# Patient Record
Sex: MALE | Race: WHITE | NOT HISPANIC OR LATINO | Employment: OTHER | URBAN - METROPOLITAN AREA
[De-identification: names, ages, dates, MRNs, and addresses within clinical notes are randomized per-mention and may not be internally consistent; named-entity substitution may affect disease eponyms.]

---

## 2024-01-25 ENCOUNTER — APPOINTMENT (OUTPATIENT)
Dept: MRI IMAGING | Facility: CLINIC | Age: 74
End: 2024-01-25
Attending: FAMILY MEDICINE
Payer: COMMERCIAL

## 2024-01-25 ENCOUNTER — HOSPITAL ENCOUNTER (EMERGENCY)
Facility: CLINIC | Age: 74
Discharge: HOME | End: 2024-01-25
Attending: FAMILY MEDICINE | Admitting: FAMILY MEDICINE
Payer: COMMERCIAL

## 2024-01-25 VITALS
TEMPERATURE: 97.6 F | OXYGEN SATURATION: 96 % | DIASTOLIC BLOOD PRESSURE: 97 MMHG | BODY MASS INDEX: 30.81 KG/M2 | RESPIRATION RATE: 16 BRPM | SYSTOLIC BLOOD PRESSURE: 155 MMHG | HEART RATE: 62 BPM | WEIGHT: 208 LBS | HEIGHT: 69 IN

## 2024-01-25 DIAGNOSIS — R41.3 MEMORY LOSS: ICD-10-CM

## 2024-01-25 LAB
ALBUMIN SERPL BCG-MCNC: 4.4 G/DL (ref 3.5–5.2)
ALP SERPL-CCNC: 97 U/L (ref 40–150)
ALT SERPL W P-5'-P-CCNC: 15 U/L (ref 0–70)
ANION GAP SERPL CALCULATED.3IONS-SCNC: 11 MMOL/L (ref 7–15)
AST SERPL W P-5'-P-CCNC: 26 U/L (ref 0–45)
BASOPHILS # BLD AUTO: 0.1 10E3/UL (ref 0–0.2)
BASOPHILS NFR BLD AUTO: 1 %
BILIRUB SERPL-MCNC: 1.6 MG/DL
BUN SERPL-MCNC: 21.4 MG/DL (ref 8–23)
CALCIUM SERPL-MCNC: 9.4 MG/DL (ref 8.8–10.2)
CHLORIDE SERPL-SCNC: 103 MMOL/L (ref 98–107)
CREAT SERPL-MCNC: 1.01 MG/DL (ref 0.67–1.17)
DEPRECATED HCO3 PLAS-SCNC: 25 MMOL/L (ref 22–29)
EGFRCR SERPLBLD CKD-EPI 2021: 78 ML/MIN/1.73M2
EOSINOPHIL # BLD AUTO: 0.2 10E3/UL (ref 0–0.7)
EOSINOPHIL NFR BLD AUTO: 2 %
ERYTHROCYTE [DISTWIDTH] IN BLOOD BY AUTOMATED COUNT: 14.6 % (ref 10–15)
FOLATE SERPL-MCNC: 27.1 NG/ML (ref 4.6–34.8)
GLUCOSE BLDC GLUCOMTR-MCNC: 164 MG/DL (ref 70–99)
GLUCOSE SERPL-MCNC: 158 MG/DL (ref 70–99)
HCT VFR BLD AUTO: 49.1 % (ref 40–53)
HGB BLD-MCNC: 16.3 G/DL (ref 13.3–17.7)
HOLD SPECIMEN: NORMAL
IMM GRANULOCYTES # BLD: 0 10E3/UL
IMM GRANULOCYTES NFR BLD: 0 %
LYMPHOCYTES # BLD AUTO: 1.5 10E3/UL (ref 0.8–5.3)
LYMPHOCYTES NFR BLD AUTO: 17 %
MCH RBC QN AUTO: 28.9 PG (ref 26.5–33)
MCHC RBC AUTO-ENTMCNC: 33.2 G/DL (ref 31.5–36.5)
MCV RBC AUTO: 87 FL (ref 78–100)
MONOCYTES # BLD AUTO: 0.8 10E3/UL (ref 0–1.3)
MONOCYTES NFR BLD AUTO: 9 %
NEUTROPHILS # BLD AUTO: 6.4 10E3/UL (ref 1.6–8.3)
NEUTROPHILS NFR BLD AUTO: 71 %
NRBC # BLD AUTO: 0 10E3/UL
NRBC BLD AUTO-RTO: 0 /100
PLATELET # BLD AUTO: 374 10E3/UL (ref 150–450)
POTASSIUM SERPL-SCNC: 4.5 MMOL/L (ref 3.4–5.3)
PROT SERPL-MCNC: 7.5 G/DL (ref 6.4–8.3)
RBC # BLD AUTO: 5.64 10E6/UL (ref 4.4–5.9)
SODIUM SERPL-SCNC: 139 MMOL/L (ref 135–145)
TSH SERPL DL<=0.005 MIU/L-ACNC: 4.04 UIU/ML (ref 0.3–4.2)
WBC # BLD AUTO: 8.9 10E3/UL (ref 4–11)

## 2024-01-25 PROCEDURE — 70553 MRI BRAIN STEM W/O & W/DYE: CPT

## 2024-01-25 PROCEDURE — 84443 ASSAY THYROID STIM HORMONE: CPT | Performed by: FAMILY MEDICINE

## 2024-01-25 PROCEDURE — 99284 EMERGENCY DEPT VISIT MOD MDM: CPT | Performed by: FAMILY MEDICINE

## 2024-01-25 PROCEDURE — 82962 GLUCOSE BLOOD TEST: CPT

## 2024-01-25 PROCEDURE — 82040 ASSAY OF SERUM ALBUMIN: CPT | Performed by: FAMILY MEDICINE

## 2024-01-25 PROCEDURE — 85025 COMPLETE CBC W/AUTO DIFF WBC: CPT | Performed by: FAMILY MEDICINE

## 2024-01-25 PROCEDURE — A9585 GADOBUTROL INJECTION: HCPCS | Performed by: FAMILY MEDICINE

## 2024-01-25 PROCEDURE — 255N000002 HC RX 255 OP 636: Performed by: FAMILY MEDICINE

## 2024-01-25 PROCEDURE — 36415 COLL VENOUS BLD VENIPUNCTURE: CPT | Performed by: FAMILY MEDICINE

## 2024-01-25 PROCEDURE — 82607 VITAMIN B-12: CPT | Performed by: FAMILY MEDICINE

## 2024-01-25 PROCEDURE — 99285 EMERGENCY DEPT VISIT HI MDM: CPT | Mod: 25 | Performed by: FAMILY MEDICINE

## 2024-01-25 PROCEDURE — 82746 ASSAY OF FOLIC ACID SERUM: CPT | Performed by: FAMILY MEDICINE

## 2024-01-25 RX ORDER — ATORVASTATIN CALCIUM 40 MG/1
1 TABLET, FILM COATED ORAL DAILY
COMMUNITY
Start: 2024-01-19 | End: 2024-06-21

## 2024-01-25 RX ORDER — ATENOLOL 50 MG/1
1 TABLET ORAL DAILY
COMMUNITY
Start: 2024-01-19 | End: 2024-06-21

## 2024-01-25 RX ORDER — GADOBUTROL 604.72 MG/ML
9.5 INJECTION INTRAVENOUS ONCE
Status: COMPLETED | OUTPATIENT
Start: 2024-01-25 | End: 2024-01-25

## 2024-01-25 RX ORDER — FLUTICASONE PROPIONATE 50 MCG
1 SPRAY, SUSPENSION (ML) NASAL DAILY PRN
COMMUNITY
End: 2024-03-28

## 2024-01-25 RX ORDER — ASPIRIN 81 MG/1
81 TABLET ORAL DAILY
COMMUNITY
Start: 2024-01-15

## 2024-01-25 RX ORDER — LOSARTAN POTASSIUM 50 MG/1
50 TABLET ORAL DAILY
COMMUNITY
Start: 2024-01-19 | End: 2024-06-21

## 2024-01-25 RX ORDER — TETRAHYDROZOLINE HCL 0.05 %
1 DROPS OPHTHALMIC (EYE) 3 TIMES DAILY PRN
COMMUNITY

## 2024-01-25 RX ADMIN — GADOBUTROL 9.5 ML: 604.72 INJECTION INTRAVENOUS at 17:37

## 2024-01-25 ASSESSMENT — ACTIVITIES OF DAILY LIVING (ADL)
ADLS_ACUITY_SCORE: 35
ADLS_ACUITY_SCORE: 35

## 2024-01-25 NOTE — ED TRIAGE NOTES
"Family brings in for concerns of mentation changes recent TIA with \"big time med changes\"    Family reports slowed speech word difficulties  Started about 1 hr ago complains of   Slight headache \"     Triage Assessment (Adult)       Row Name 01/25/24 1614          Triage Assessment    Airway WDL WDL        Respiratory WDL    Respiratory WDL WDL        Skin Circulation/Temperature WDL    Skin Circulation/Temperature WDL WDL        Cardiac WDL    Cardiac WDL WDL        Peripheral/Neurovascular WDL    Peripheral Neurovascular WDL WDL        Cognitive/Neuro/Behavioral WDL    Cognitive/Neuro/Behavioral WDL WDL                     "

## 2024-01-25 NOTE — MEDICATION SCRIBE - ADMISSION MEDICATION HISTORY
Medication Scribe Admission Medication History    Admission medication history is complete. The information provided in this note is only as accurate as the sources available at the time of the update.    Information Source(s): Patient via in-person    Pertinent Information:     Changes made to PTA medication list:  Added: asa 81, atenolol 50, lipitor 40, zheng nasal spray, losartan 25, eye drops  Deleted: None  Changed: None    Medication Affordability:       Allergies reviewed with patient and updates made in EHR: yes    Medication History Completed By: Felisha De La Cruz 1/25/2024 5:38 PM    PTA Med List   Medication Sig Note Last Dose    aspirin 81 MG EC tablet Take 81 mg by mouth daily  1/24/2024 at 1900    atenolol (TENORMIN) 50 MG tablet Take 1 tablet by mouth daily  1/24/2024 at 1900    atorvastatin (LIPITOR) 40 MG tablet Take 1 tablet by mouth daily  1/24/2024 at 1900    fluticasone (FLONASE) 50 MCG/ACT nasal spray Spray 1 spray into both nostrils daily as needed for rhinitis or allergies 1/25/2024: OTC, [place] brand 1/25/2024 at am    losartan (COZAAR) 25 MG tablet Take 1 tablet by mouth daily  1/24/2024 at 1900    tetrahydrozoline (VISINE) 0.05 % ophthalmic solution Place 1 drop into both eyes 3 times daily as needed  1/25/2024 at am

## 2024-01-25 NOTE — ED NOTES
"Pt reports he did not recognize his own vehicle in his driveway this morning. Has had intermittent \"missing periods of time\" today. Family reports pt was giving one word answers instead of his usual elaboration. Also reports right sided headache for three weeks. Pt had similar episodes in [place] where he was seen at [facility].   "

## 2024-01-25 NOTE — ED PROVIDER NOTES
History     Chief Complaint   Patient presents with    Aphasia     HPI    Prabhakar Randall is a 73 year old male who comes in from home with his son with an episode of memory impairment.  He had a nearly identical episode 2 weeks ago while driving in [place] and was seen in the emergency department there where he had a workup that included CT angiogram of the head and neck and an unenhanced CT scan of the brain that showed chronic small vessel ischemic disease and no other findings.  His vasculature was widely patent.  He was given a diagnosis of TIA and treated with low-dose aspirin and advised to follow-up in primary care for adjustment of his risk factors.  Since then he has had his blood pressure medication dosage doubled and the addition of a second medication that he does not know the name of as well as the addition of cholesterol-lowering medication.  The symptoms consist of not remembering what he is doing.  Today he went out to the car in the morning and could not remember where he had bought his new car from.  He went back into the house and a few hours later he could not remember what he was supposed to be doing tomorrow.  He is slow to respond to questions from his family and is giving more limited one-word type responses rather than expanding on his answers to questions that would be more typical for him.  This is what happened when he was in [place] and those symptoms lasted about 30 minutes at that time.  He had a previous episode a year before that for which she had a workup that was negative but was referred to neurology and never followed up.  He has not seen neurology since this occurred.  At his evaluation previously he did not have MRI imaging of the brain.  He does not smoke and rarely uses any alcohol.  He has had intermittent diplopia off-and-on for 6 to 8 months occurring about once per month he had an episode of this a few days ago.  He seen an eye doctor for it in the past.  He has noticed  "insomnia in the past 3 months.  Currently he denies weakness or numbness in 1 arm or 1 leg or any difficulty with finding words to say.  He has not noticed nor have others noticed slurred speech.  He has not had vision loss.  He has had a little bit of weight gain but no weight loss.  He is not short of breath.  He does not have chest pain.  He does have abdominal pain.  He has not had any change in his bowel or bladder function.  He has not been having fevers or signs of acute illness.    Allergies:  Allergies   Allergen Reactions    Oxycodone Other (See Comments)     Other reaction(s): hypotension       Problem List:    There are no problems to display for this patient.       Past Medical History:    No past medical history on file.    Past Surgical History:    No past surgical history on file.    Family History:    No family history on file.    Social History:  Marital Status:   [2]        Medications:    aspirin 81 MG EC tablet  atenolol (TENORMIN) 50 MG tablet  atorvastatin (LIPITOR) 40 MG tablet  fluticasone (FLONASE) 50 MCG/ACT nasal spray  losartan (COZAAR) 25 MG tablet  tetrahydrozoline (VISINE) 0.05 % ophthalmic solution          Review of Systems    All other systems are reviewed and are negative    Physical Exam   BP: (!) 140/91  Pulse: 64  Temp: 97.6  F (36.4  C)  Resp: 16  Height: 175.3 cm (5' 9\")  Weight: 94.3 kg (208 lb)  SpO2: 96 %      Physical Exam    Nursing note and vitals were reviewed.  Constitutional: Awake and alert, adequately nourished and developed appearing 73-year-old in no apparent discomfort, who does not appear acutely ill, and who answers questions appropriately and cooperates with examination.  HEENT: Voice quality is normal.  Atraumatic and normocephalic.  PERRL.  EOMI.   Neck: Freely mobile.  Cardiovascular: Cardiac examination reveals normal heart rate and regular rhythm without murmur.  Pulmonary/Chest: Breathing is unlabored.  Breath sounds are clear and equal " bilaterally.  There no retractions, tachypnea, rales, wheezes, or rhonchi.  Abdomen: Soft, nontender, no HSM or masses rebound or guarding.  Musculoskeletal: Extremities are warm and well-perfused and without edema  Neurological: Alert, oriented, thought content logical, coherent.  Cranial nerve testing is normal.  Motor strength is intact in the upper and lower extremities on manual muscle testing.  Cerebellar testing is normal.  Motor tone is normal.  Speech is fluent.  Skin: Warm, dry, no rashes.  Psychiatric: Affect broad and appropriate.  National Institutes of Health Stroke Scale  Exam Interval: Baseline   Score    Level of consciousness: (0)   Alert, keenly responsive    LOC questions: (0)   Answers both questions correctly    LOC commands: (0)   Performs both tasks correctly    Best gaze: (0)   Normal    Visual: (0)   No visual loss    Facial palsy: (0)   Normal symmetrical movements    Motor arm (left): (0)   No drift    Motor arm (right): (0)   No drift    Motor leg (left): (0)   No drift    Motor leg (right): (0)   No drift    Limb ataxia: (0)   Absent    Sensory: (0)   Normal- no sensory loss    Best language: (0)   Normal- no aphasia    Dysarthria: (0)   Normal    Extinction and inattention: (0)   No abnormality        Total Score:  0        ED Course                 Procedures              Critical Care time:  none               Results for orders placed or performed during the hospital encounter of 01/25/24 (from the past 24 hour(s))   [place] [facility]    Narrative    The following orders were created for panel order [place] [facility].  Procedure                               Abnormality         Status                     ---------                               -----------         ------                     Extra Blue Top Tube[[id number]]                              Final result               Extra Red Top Tube[[id number]]                               Final result               Extra Green Top  (Lithium...[[id number]]                      Final result               Extra Purple Top Tube[[id number]]                            Final result                 Please view results for these tests on the individual orders.   Extra Blue Top Tube   Result Value Ref Range    Hold Specimen JIC    Extra Red Top Tube   Result Value Ref Range    Hold Specimen JIC    Extra Green Top (Lithium Heparin) Tube   Result Value Ref Range    Hold Specimen JIC    Extra Purple Top Tube   Result Value Ref Range    Hold Specimen JIC    Glucose by meter   Result Value Ref Range    GLUCOSE BY METER POCT 164 (H) 70 - 99 mg/dL   CBC with platelets differential    Narrative    The following orders were created for panel order CBC with platelets differential.  Procedure                               Abnormality         Status                     ---------                               -----------         ------                     CBC with platelets and d...[[id number]]                      Final result                 Please view results for these tests on the individual orders.   Comprehensive metabolic panel   Result Value Ref Range    Sodium 139 135 - 145 mmol/L    Potassium 4.5 3.4 - 5.3 mmol/L    Carbon Dioxide (CO2) 25 22 - 29 mmol/L    Anion Gap 11 7 - 15 mmol/L    Urea Nitrogen 21.4 8.0 - 23.0 mg/dL    Creatinine 1.01 0.67 - 1.17 mg/dL    GFR Estimate 78 >60 mL/min/1.73m2    Calcium 9.4 8.8 - 10.2 mg/dL    Chloride 103 98 - 107 mmol/L    Glucose 158 (H) 70 - 99 mg/dL    Alkaline Phosphatase 97 40 - 150 U/L    AST 26 0 - 45 U/L    ALT 15 0 - 70 U/L    Protein Total 7.5 6.4 - 8.3 g/dL    Albumin 4.4 3.5 - 5.2 g/dL    Bilirubin Total 1.6 (H) <=1.2 mg/dL   TSH with free T4 reflex   Result Value Ref Range    TSH 4.04 0.30 - 4.20 uIU/mL   CBC with platelets and differential   Result Value Ref Range    WBC Count 8.9 4.0 - 11.0 10e3/uL    RBC Count 5.64 4.40 - 5.90 10e6/uL    Hemoglobin 16.3 13.3 - 17.7 g/dL    Hematocrit 49.1 40.0 - 53.0 %     MCV 87 78 - 100 fL    MCH 28.9 26.5 - 33.0 pg    MCHC 33.2 31.5 - 36.5 g/dL    RDW 14.6 10.0 - 15.0 %    Platelet Count 374 150 - 450 10e3/uL    % Neutrophils 71 %    % Lymphocytes 17 %    % Monocytes 9 %    % Eosinophils 2 %    % Basophils 1 %    % Immature Granulocytes 0 %    NRBCs per 100 WBC 0 <1 /100    Absolute Neutrophils 6.4 1.6 - 8.3 10e3/uL    Absolute Lymphocytes 1.5 0.8 - 5.3 10e3/uL    Absolute Monocytes 0.8 0.0 - 1.3 10e3/uL    Absolute Eosinophils 0.2 0.0 - 0.7 10e3/uL    Absolute Basophils 0.1 0.0 - 0.2 10e3/uL    Absolute Immature Granulocytes 0.0 <=0.4 10e3/uL    Absolute NRBCs 0.0 10e3/uL   MR Brain w/o & w Contrast    Narrative    EXAM: MR BRAIN W/O and W CONTRAST  LOCATION: [facility]  DATE: 1/25/2024    INDICATION: memory loss  COMPARISON: None.  CONTRAST: 9.5 mL Gadavist  TECHNIQUE: Routine multiplanar multisequence head MRI without and with intravenous contrast.    FINDINGS:  INTRACRANIAL CONTENTS: No acute or subacute infarct. No mass, acute hemorrhage, or extra-axial fluid collections. Patchy nonspecific T2/FLAIR hyperintensities within the cerebral white matter and radha most consistent with mild to moderate chronic   microvascular ischemic change. Minimal generalized cerebral atrophy, within normal limits for age. No hydrocephalus. A few punctate chronic microhemorrhages are noted. Normal position of the cerebellar tonsils. No pathologic contrast enhancement.    SELLA: No abnormality accounting for technique.    OSSEOUS STRUCTURES/SOFT TISSUES: Normal marrow signal. The major intracranial vascular flow voids are maintained.     ORBITS: Prior bilateral cataract surgery. Visualized portions of the orbits are otherwise unremarkable.     SINUSES/MASTOIDS: Mild mucosal thickening scattered about the paranasal sinuses. No middle ear or mastoid effusion.       Impression    IMPRESSION:  1.  No acute intracranial abnormality.  2.  Minimal age-related generalized  parenchymal volume loss. No particular lobar predominance.       Medications   gadobutrol (GADAVIST) injection 9.5 mL (9.5 mLs Intravenous $Given 1/25/24 0332)       MOCA score: 25 he lost points for not being able to draw a cube and for only being able to recall 2 words    Assessments & Plan (with Medical Decision Making)     73-year-old male presented with 2 episodes of memory loss as described above.  Had a workup 2 weeks ago [place] with CT angiogram of the head and neck and CT scan of the brain that were unrevealing.  Today underwent MRI imaging with the results as noted above again unrevealing with just some age-related volume loss.  The cause of his symptoms is uncertain.  I discussed the findings with he and his sons.  There is no evidence that he has had a stroke.  I discussed his case with Dr. Maciel and neurology.  Will add B12 testing to the labs already done.  I have ordered an outpatient EEG and referred him for neurology consultation.  Have also referred him to primary care to follow-up on the additional blood tests and help coordinate follow-up care.  He and his sons expressed understanding and their questions were answered.    I have reviewed the nursing notes.    I have reviewed the findings, diagnosis, plan and need for follow up with the patient.         New Prescriptions    No medications on file       Final diagnoses:   Memory loss       1/25/2024   [facility] EMERGENCY DEPT       Khai Borges MD  01/25/24 2014

## 2024-01-26 LAB — VIT B12 SERPL-MCNC: 860 PG/ML (ref 232–1245)

## 2024-01-26 NOTE — PROGRESS NOTES
I was called regarding this patient and his 2 episodes of difficulty recalling what he was doing, where he was going, etc.  The differential includes an underlying cognitive impairment not yet diagnosed, seizure disorder, as well as TIA (though I find the diagnosis of TIA to be less likely given the lack of any lateralizing symptoms/any vascular distribution of symptoms).  I have recommended outpatient EEG to assess for underlying seizure disorder, as well as serum B12 and formalized cognitive assessment such as a MoCA or SLUMS assessment.  Outpatient follow-up with general neurology.    Marquise Maciel MD

## 2024-01-31 NOTE — PROGRESS NOTES
"In person evaluation    HPI  2024, in person consultation      73-year-old being evaluated neurologically for:  Memory loss/transient memory difficulty    A.  Memory loss/transient memory difficulty repetitive       Spell 2024 (30 minutes), forgot that he had a blood draw the next day       Spell 1/10/2024 (30 minutes amnesia), was able to drive and do tasks but was a little bit unsure of where they were at and where they were going on a trip [place]       Winter 2023 while staying in [place] (2 hours \"amnesia\"), this episode may have lasted as long as 12 hours kind of vague difficulty with concentration          Patient was in [place] and evaluated for transient memory difficulty.          Spell consists of not remembering what he is doing.        Gives limited one-word answers when family asks him questions        Symptoms last about 30 minutes at a time            Has been more sleep deprived recently        Significant hearing loss    MoCA  2024,    29 out of 30    B.  TIA 2024    C.  Vascular risk factors        Hypertension/hyperlipidemia/TIA/factor V Leiden      Past medical history  TIA 2024  Hypertension  Hyperlipidemia  Factor V Leiden  BPH  Arthritis of the knee  Cluster versus migraine headaches times years more right-sided  Hearing loss        Habits  Past smoker quit   Alcohol once per month rare  Played hockey and baseball but does not recall any significant head trauma      Family history  Father heart disease  Mother may have had memory loss in her 90s  at age 100  Sister aortic aneurysm            Workup  Laboratory data 2016  Factor II gene mutation not detected  Cardiolipin antibody negative, Antithrombin III antibody negative Lupus anticoagulant negative  Beta-2 glycoprotein negative, protein C and protein S not deficient    CT scan head 1/10/2024  1. There is no evidence of acute intracranial abnormality.   2.  Mild brain volume loss.   3.  There are " bilateral changes of chronic small vessel  ischemic disease in the periventricular deep white matter.   CTA head and neck 1/10/2024  1. Normal CTA. No focal stenosis, dissection or emergent large vessel  occlusion identified.   2. Incidental note is made of medial deviation of the internal carotid arteries more pronounced on the left than the right.       This is  a normal variant.   MRI brain 1/25/2024  1.  No acute intracranial abnormality.  2.  Minimal age-related generalized parenchymal volume loss.       No particular lobar predominance.  Echo 1/25/2024, ejection fraction 55-60%, left atrium normal size  EEG 2/1/2024, 9.5-10 Hz PDR  Normal awake and drowsy EEG,        Laboratory data review                            1/2024  NA/K              139/4.5  BUN/Cr          21.4/1.01  GLU               158  AST                26  WBC/HGB      8.9/16.3  PLTs               374,000    B12                 868  TSH                4.04  Folate             27.1    MoCA  2/2/2024,    29 out of 30    Exam    Review of systems  Pertinent positives negatives  Has possibly migraines or cluster headaches for years on the right side  Hearing loss and ringing in the ears  Excessive sleepiness all the time  The memory trouble as above  Hearing loss otherwise per HPI above      General exam  Blood pressure 160/101, pulse 55  Alert orient x 3  Lungs clear  Heart rate regular  Abdomen soft  Symmetrical pulses  No edema the feet    Neurologic exam  Alert orient x 3  Prosody speech normal  Naming normal  Comprehension normal  Repetition normal  No aphasia  No neglect    MoCA  2/2/2024,    29 out of 30    Cranial nerves II through XII  No ophthalmoplegia  No nystagmus  Visual fields intact  Face symmetrical  Tongue twisters good  Does have hearing loss even with his hearing aid in  Mild decreased blink    Upper extremities  No drift  No tremor  Normal finger-nose    Lower extremities  Distal proximal strength good    Reflexes  "symmetrical    Gait  Normal    Assessment/plan    1.  Memory loss/transient memory difficulty repetitive       Spell 1/25/2024 (30 minutes)       Spell 1/10/2024 (30 minutes amnesia)       Winter 2023 while staying in [place] (2 hours \"amnesia\")          Patient was in [place] and evaluated for transient memory difficulty.          Spell consists of not remembering what he is doing.        Gives limited one-word answers when family asks him questions        Symptoms last about 30 minutes at a time            Has been more sleep deprived recently    MoCA  2/2/2024,    29 out of 30    2.  Vascular risk factors        Hypertension/hyperlipidemia/TIA/factor V Leiden      3.  Excessive fatigue and tiredness question sleep apnea    Diagnosis  Episodic memory loss  Questionable TGA  Question sleep apnea  Multifocal subcortical white matter changes on MRI    Rule out sleep apnea with sleep study  Hold off on empiric seizure medication    May have some neurodegenerative component but subtle and may be exacerbated by underlying sleep apnea    Follow-up in 3 months after the above testing    Discussed with patient wife and son  Reviewed actual MRI scans with them    Total care time today 75 minutes    As part of visit today  Reviewed CT scan/MRI scans as above  Reviewed laboratory data  Reviewed recent hospitalization EMR notes  Reviewed cardiology note 1/19/2024    "

## 2024-02-01 ENCOUNTER — HOSPITAL ENCOUNTER (OUTPATIENT)
Dept: NEUROLOGY | Facility: CLINIC | Age: 74
Discharge: HOME | End: 2024-02-01
Attending: FAMILY MEDICINE | Admitting: FAMILY MEDICINE
Payer: COMMERCIAL

## 2024-02-01 DIAGNOSIS — R41.3 MEMORY LOSS: ICD-10-CM

## 2024-02-01 PROCEDURE — 95819 EEG AWAKE AND ASLEEP: CPT

## 2024-02-01 PROCEDURE — 95819 EEG AWAKE AND ASLEEP: CPT | Mod: 26 | Performed by: PSYCHIATRY & NEUROLOGY

## 2024-02-02 ENCOUNTER — OFFICE VISIT (OUTPATIENT)
Dept: NEUROLOGY | Facility: CLINIC | Age: 74
End: 2024-02-02
Attending: FAMILY MEDICINE
Payer: COMMERCIAL

## 2024-02-02 VITALS
HEIGHT: 69 IN | BODY MASS INDEX: 30.96 KG/M2 | WEIGHT: 209 LBS | SYSTOLIC BLOOD PRESSURE: 160 MMHG | HEART RATE: 55 BPM | DIASTOLIC BLOOD PRESSURE: 101 MMHG

## 2024-02-02 DIAGNOSIS — R41.3 EPISODIC MEMORY LOSS: ICD-10-CM

## 2024-02-02 DIAGNOSIS — G45.4 TRANSIENT GLOBAL AMNESIA: Primary | ICD-10-CM

## 2024-02-02 PROCEDURE — 99205 OFFICE O/P NEW HI 60 MIN: CPT | Performed by: PSYCHIATRY & NEUROLOGY

## 2024-02-02 PROCEDURE — 99417 PROLNG OP E/M EACH 15 MIN: CPT | Performed by: PSYCHIATRY & NEUROLOGY

## 2024-02-02 ASSESSMENT — MONTREAL COGNITIVE ASSESSMENT (MOCA)
7. [VIGILENCE] TAP WHEN HEARING DESIGNATED LETTER: 1
9. REPEAT EACH SENTENCE: 2
11. FOR EACH PAIR OF WORDS, WHAT CATEGORY DO THEY BELONG TO (OUT OF 2): 2
10. [FLUENCY] NAME WORDS STARTING WITH DESIGNATED LETTER: 1
WHAT LEVEL OF EDUCATION WAS ATTAINED: 0
VISUOSPATIAL/EXECUTIVE SUBSCORE: 5
8. SERIAL SUBTRACTION OF 7S: 3
6. READ LIST OF DIGITS [FORWARD/BACKWARD]: 2
13. ORIENTATION SUBSCORE: 6
4. NAME EACH OF THE THREE ANIMALS SHOWN: 3
12. MEMORY INDEX SCORE: 4
WHAT IS THE TOTAL SCORE (OUT OF 30): 29

## 2024-02-02 NOTE — NURSING NOTE
DIPESH COGNITIVE ASSESSMENT (MOCA)  Version 7.1 Original Version  VISUOSPATIAL/EXECUTIVE               COPY CUBE      [   1 ]                                [  1  ] DRAW CLOCK (Ten past eleven)  (3 points)    [ 1   ]                    [  1  ]               [ 1   ]       Contour            Numbers     Hands POINTS                  5 / 5   NAMING    [  1 ]                                                                        [  1  ]                                             [  1  ]  Lisergio Gacria                                Camel                    3 / 3   MEMORY Read list of words, subject must repeat them. Do 2 trials, even if 1st trial is successful. Do a recall after 5 minutes  FACE VELVET Gnosticism BRANDON RED No Points    1st          2nd         ATTENTION Read list of digits (1 digit/sec) Subject has to repeat in the forward order       [  1  ]   2  1  8  5  4                                [   1 ] 7 4 2                         2 /2   Read list of letters. The subject must tap with his hand at each letter A. No points if > 2 errors.  [  1  ] F B A C M N A A J K L B A F A K D E A A A J A M O F A A B             1 /1   Serial 7 subtraction starting at 100          [   1 ] 93         [  1  ] 86          [ 1   ] 79          [ 1   ] 72         [  1  ] 65   4 or 5 correct subtractions: 3 points,  2 or 3 correct: 2 points,  1correct: 1 point,   0 correct: 0 points           3 /3   LANGUAGE Repeat: I only know that Remberto is the one to help today. [   1  ]                                      The cat always hid under the couch when dogs were in the room. [  1 ]               2/2   Fluency: Name maximum number of words in one minute that begin with the letter F                                                                                                                    [  1  ] __18_ (N > 11 words)              1 /1   ABSTRACTION Similarity  between e.g. banana-orange=fruit                                                                   [  1  ] train-bicycle                      [ 1   ] watch-ruler             2 /2   DELAYED  RECALL Has to recall words  WITH NO CUE FACE  [  1  ] VELVET  [   0 ] Jehovah's witness  [ 1   ]  BRANDON  [  1  ] RED  [ 1   ] Points for UNCUED recall only           4 /5           OPTIONAL Category cue           Multiple choice cue          ORIENTATION  [  1  ] Date     [  1  ] Month       [   1 ] Year      [  1  ] Day      [  1  ] Place        [  1  ] City         6 /6   TOTAL  Normal > 26/30 Add 1 point if < 12 years education       29 /30

## 2024-02-02 NOTE — LETTER
"    2024         RE: Prabhakar Randall  [address] [facility] [address]        Dear Colleague,    Thank you for referring your patient, Prabhakar Randall, to the [facility] [place]. Please see a copy of my visit note below.    In person evaluation    HPI  2024, in person consultation      73-year-old being evaluated neurologically for:  Memory loss/transient memory difficulty    A.  Memory loss/transient memory difficulty repetitive       Spell 2024 (30 minutes), forgot that he had a blood draw the next day       Spell 1/10/2024 (30 minutes amnesia), was able to drive and do tasks but was a little bit unsure of where they were at and where they were going on a trip driving south       Winter 2023 while staying in [place] (2 hours \"amnesia\"), this episode may have lasted as long as 12 hours kind of vague difficulty with concentration          Patient was in [place] and evaluated for transient memory difficulty.          Spell consists of not remembering what he is doing.        Gives limited one-word answers when family asks him questions        Symptoms last about 30 minutes at a time            Has been more sleep deprived recently        Significant hearing loss    MoCA  2024,    29 out of 30    B.  TIA 2024    C.  Vascular risk factors        Hypertension/hyperlipidemia/TIA/factor V Leiden      Past medical history  TIA 2024  Hypertension  Hyperlipidemia  Factor V Leiden  BPH  Arthritis of the knee  Cluster versus migraine headaches times years more right-sided  Hearing loss        Habits  Past smoker quit   Alcohol once per month rare  Played hockey and baseball but does not recall any significant head trauma      Family history  Father heart disease  Mother may have had memory loss in her 90s  at age 100  Sister aortic aneurysm            Workup  Laboratory data 2016  Factor II gene mutation not detected  Cardiolipin antibody negative, Antithrombin III " antibody negative Lupus anticoagulant negative  Beta-2 glycoprotein negative, protein C and protein S not deficient    CT scan head 1/10/2024  1. There is no evidence of acute intracranial abnormality.   2.  Mild brain volume loss.   3.  There are bilateral changes of chronic small vessel  ischemic disease in the periventricular deep white matter.   CTA head and neck 1/10/2024  1. Normal CTA. No focal stenosis, dissection or emergent large vessel  occlusion identified.   2. Incidental note is made of medial deviation of the internal carotid arteries more pronounced on the left than the right.       This is  a normal variant.   MRI brain 1/25/2024  1.  No acute intracranial abnormality.  2.  Minimal age-related generalized parenchymal volume loss.       No particular lobar predominance.  Echo 1/25/2024, ejection fraction 55-60%, left atrium normal size  EEG 2/1/2024, 9.5-10 Hz PDR  Normal awake and drowsy EEG,        Laboratory data review                            1/2024  NA/K              139/4.5  BUN/Cr          21.4/1.01  GLU               158  AST                26  WBC/HGB      8.9/16.3  PLTs               374,000    B12                 868  TSH                4.04  Folate             27.1    MoCA  2/2/2024,    29 out of 30    Exam    Review of systems  Pertinent positives negatives  Has possibly migraines or cluster headaches for years on the right side  Hearing loss and ringing in the ears  Excessive sleepiness all the time  The memory trouble as above  Hearing loss otherwise per HPI above      General exam  Blood pressure 160/101, pulse 55  Alert orient x 3  Lungs clear  Heart rate regular  Abdomen soft  Symmetrical pulses  No edema the feet    Neurologic exam  Alert orient x 3  Prosody speech normal  Naming normal  Comprehension normal  Repetition normal  No aphasia  No neglect    MoCA  2/2/2024,    29 out of 30    Cranial nerves II through XII  No ophthalmoplegia  No nystagmus  Visual fields intact  Face  "symmetrical  Tongue twisters good  Does have hearing loss even with his hearing aid in  Mild decreased blink    Upper extremities  No drift  No tremor  Normal finger-nose    Lower extremities  Distal proximal strength good    Reflexes symmetrical    Gait  Normal    Assessment/plan    1.  Memory loss/transient memory difficulty repetitive       Spell 1/25/2024 (30 minutes)       Spell 1/10/2024 (30 minutes amnesia)       Winter 2023 while staying in [place] (2 hours \"amnesia\")          Patient was in [place] and evaluated for transient memory difficulty.          Spell consists of not remembering what he is doing.        Gives limited one-word answers when family asks him questions        Symptoms last about 30 minutes at a time            Has been more sleep deprived recently    MoCA  2/2/2024,    29 out of 30    2.  Vascular risk factors        Hypertension/hyperlipidemia/TIA/factor V Leiden      3.  Excessive fatigue and tiredness question sleep apnea    Diagnosis  Episodic memory loss  Questionable TGA  Question sleep apnea  Multifocal subcortical white matter changes on MRI    Rule out sleep apnea with sleep study  Hold off on empiric seizure medication    May have some neurodegenerative component but subtle and may be exacerbated by underlying sleep apnea    Follow-up in 3 months after the above testing    Discussed with patient wife and son  Reviewed actual MRI scans with them    Total care time today 75 minutes    As part of visit today  Reviewed CT scan/MRI scans as above  Reviewed laboratory data  Reviewed recent hospitalization EMR notes  Reviewed cardiology note 1/19/2024      Again, thank you for allowing me to participate in the care of your patient.        Sincerely,        megha Jimenez MD  "

## 2024-02-02 NOTE — NURSING NOTE
Chief Complaint   Patient presents with    Memory Loss     Pt is having episodes of memory lapses. Once was driving to [place] and forgot where he was going, and who he was going to see, about a minute later could remember everything. Had one episode where he woke in the morning and couldn't remember the prior day. Lasts for a short time. He also experiences a headache that is focused on right side after these episodes.       Karla De La Cruz LPN on 2/2/2024 at 12:16 PM

## 2024-02-06 ENCOUNTER — OFFICE VISIT (OUTPATIENT)
Dept: FAMILY MEDICINE | Facility: CLINIC | Age: 74
End: 2024-02-06
Payer: COMMERCIAL

## 2024-02-06 ENCOUNTER — TELEPHONE (OUTPATIENT)
Dept: FAMILY MEDICINE | Facility: CLINIC | Age: 74
End: 2024-02-06

## 2024-02-06 VITALS
TEMPERATURE: 98.1 F | BODY MASS INDEX: 30.47 KG/M2 | RESPIRATION RATE: 20 BRPM | DIASTOLIC BLOOD PRESSURE: 90 MMHG | HEART RATE: 60 BPM | HEIGHT: 69 IN | SYSTOLIC BLOOD PRESSURE: 148 MMHG | OXYGEN SATURATION: 96 % | WEIGHT: 205.7 LBS

## 2024-02-06 DIAGNOSIS — R73.09 ELEVATED GLUCOSE: ICD-10-CM

## 2024-02-06 DIAGNOSIS — G45.4 TGA (TRANSIENT GLOBAL AMNESIA): Primary | ICD-10-CM

## 2024-02-06 DIAGNOSIS — I10 HYPERTENSION, UNSPECIFIED TYPE: ICD-10-CM

## 2024-02-06 LAB — HBA1C MFR BLD: 5.4 % (ref 0–5.6)

## 2024-02-06 PROCEDURE — 36415 COLL VENOUS BLD VENIPUNCTURE: CPT | Performed by: STUDENT IN AN ORGANIZED HEALTH CARE EDUCATION/TRAINING PROGRAM

## 2024-02-06 PROCEDURE — 83036 HEMOGLOBIN GLYCOSYLATED A1C: CPT | Performed by: STUDENT IN AN ORGANIZED HEALTH CARE EDUCATION/TRAINING PROGRAM

## 2024-02-06 PROCEDURE — 99203 OFFICE O/P NEW LOW 30 MIN: CPT | Performed by: STUDENT IN AN ORGANIZED HEALTH CARE EDUCATION/TRAINING PROGRAM

## 2024-02-06 RX ORDER — RESPIRATORY SYNCYTIAL VIRUS VACCINE 120MCG/0.5
0.5 KIT INTRAMUSCULAR ONCE
Qty: 1 EACH | Refills: 0 | Status: CANCELLED | OUTPATIENT
Start: 2024-02-06 | End: 2024-02-06

## 2024-02-06 ASSESSMENT — PAIN SCALES - GENERAL: PAINLEVEL: NO PAIN (0)

## 2024-02-06 NOTE — PROGRESS NOTES
"  1. TGA (transient global amnesia), appears to have improved, if not resolved   > has follow-up with neurologist who recommended patient get evaluated with sleep study, per patient his next available appointment wasn't until a few months out   -Since event, patient has been started on Lipitor 40 mg and aspirin 81 mg  - Adult Sleep Eval & Management  Referral; Future    2. Elevated glucose  - Hemoglobin A1c    3. Hypertension  - has follow-up with cardiology, recently had his antihypertensive regimen increased from 25 mg losartan to 50 mg     The risks, benefits and treatment options of prescribed medications or other treatments have been discussed with the patient. The patient verbalized their understanding and should call or follow up if no improvement or if they develop further problems.       Shayy Radford is a 73 year old, presenting for the following health issues:  ER F/U        2/6/2024     9:26 AM   Additional Questions   Roomed by Meagan SUÁREZ LPN   Accompanied by Wife         2/6/2024     9:26 AM   Patient Reported Additional Medications   Patient reports taking the following new medications Multi vitamin daily     HPI     ED/UC Followup:    Facility:  [facility]  Date of visit: 1/25/2024  Reason for visit: aphasia; memory loss  Current Status: patient states he's been doing good all along. Hasn't had any episodes since the 25th of January     States he has a hard time getting restful sleep   Daytime sleepiness  Worst episode occurred when he was on vacation in [place]       ROS:   As above         Objective    BP (!) 148/90 (BP Location: Right arm, Patient Position: Sitting, Cuff Size: Adult Regular)   Pulse 60   Temp 98.1  F (36.7  C) (Tympanic)   Resp 20   Ht 1.754 m (5' 9.06\")   Wt 93.3 kg (205 lb 11.2 oz)   SpO2 96%   BMI 30.33 kg/m    Body mass index is 30.33 kg/m .  Physical Exam  Constitutional:       General: He is not in acute distress.  HENT:      Head: " Normocephalic and atraumatic.      Right Ear: Tympanic membrane, ear canal and external ear normal.      Left Ear: Tympanic membrane, ear canal and external ear normal.      Ears:      Comments: Had impacted cerumen in the right ear but was able to manually remove with lighted curette      Mouth/Throat:      Mouth: Mucous membranes are moist.      Pharynx: Oropharynx is clear. No oropharyngeal exudate or posterior oropharyngeal erythema.   Eyes:      Extraocular Movements: Extraocular movements intact.   Cardiovascular:      Rate and Rhythm: Normal rate and regular rhythm.      Heart sounds: Normal heart sounds.   Pulmonary:      Effort: Pulmonary effort is normal. No respiratory distress.      Breath sounds: Normal breath sounds. No wheezing or rhonchi.   Abdominal:      Palpations: Abdomen is soft. There is no mass.      Tenderness: There is no abdominal tenderness.   Musculoskeletal:         General: No deformity. Normal range of motion.      Cervical back: Normal range of motion and neck supple.   Skin:     General: Skin is warm.      Findings: No rash.   Neurological:      General: No focal deficit present.      Mental Status: He is alert and oriented to person, place, and time.      Coordination: Coordination normal.      Gait: Gait normal.   Psychiatric:         Mood and Affect: Mood normal.                  Signed Electronically by: TOPHER BRYANT MD

## 2024-02-06 NOTE — TELEPHONE ENCOUNTER
Reason for Call:  Appointment Request    Patient requesting this type of appt:  Sleep consult referral in Epic. Urgent reqst in 3-5 days. Pt currently dilshad'd for 3.28.24. Call back Pt if able to get in sooner due to referral.     Requested provider:  in     Reason patient unable to be scheduled: Not within requested timeframe    When does patient want to be seen/preferred time:  3-5 days    Comments:   Could we send this information to you in [facility] or would you prefer to receive a phone call?:       Call taken on 2/6/2024 at 11:26 AM by Nikki Dunbar

## 2024-02-07 NOTE — TELEPHONE ENCOUNTER
Chart reviewed. Patient has soonest available appointment scheduled. Patient was added to wait list.  Clinic staff will reach out if sooner appointment becomes available.     Corazon ARANDA RN  [facility]

## 2024-03-10 ENCOUNTER — HEALTH MAINTENANCE LETTER (OUTPATIENT)
Age: 74
End: 2024-03-10

## 2024-03-26 ASSESSMENT — SLEEP AND FATIGUE QUESTIONNAIRES
HOW LIKELY ARE YOU TO NOD OFF OR FALL ASLEEP WHILE SITTING AND READING: SLIGHT CHANCE OF DOZING
HOW LIKELY ARE YOU TO NOD OFF OR FALL ASLEEP WHILE SITTING QUIETLY AFTER LUNCH WITHOUT ALCOHOL: SLIGHT CHANCE OF DOZING
HOW LIKELY ARE YOU TO NOD OFF OR FALL ASLEEP IN A CAR, WHILE STOPPED FOR A FEW MINUTES IN TRAFFIC: WOULD NEVER DOZE
HOW LIKELY ARE YOU TO NOD OFF OR FALL ASLEEP WHILE SITTING INACTIVE IN A PUBLIC PLACE: WOULD NEVER DOZE
HOW LIKELY ARE YOU TO NOD OFF OR FALL ASLEEP WHILE WATCHING TV: SLIGHT CHANCE OF DOZING
HOW LIKELY ARE YOU TO NOD OFF OR FALL ASLEEP WHEN YOU ARE A PASSENGER IN A CAR FOR AN HOUR WITHOUT A BREAK: WOULD NEVER DOZE
HOW LIKELY ARE YOU TO NOD OFF OR FALL ASLEEP WHILE SITTING AND TALKING TO SOMEONE: WOULD NEVER DOZE
HOW LIKELY ARE YOU TO NOD OFF OR FALL ASLEEP WHILE LYING DOWN TO REST IN THE AFTERNOON WHEN CIRCUMSTANCES PERMIT: SLIGHT CHANCE OF DOZING

## 2024-03-26 NOTE — PROGRESS NOTES
Does Prabhakar have a CPAP/Bipap?  No     Leeds Sleep Scale:  4  Stop Ban  BMI: 30.27  Neck: 40 cm         Outpatient Sleep Medicine Consultation:      Name: Prabhakar Randall MRN# [id number]   Age: 73 year old YOB: 1950     Date of Consultation: 2024  Consultation is requested by: Harrison Jimenez MD  [address]  [place] [address] Harrison Jimenez  Primary care provider: Elena Lopez       Reason for Sleep Consult:     Prabhakar Randall is sent by Harrison Jimenez for a sleep consultation regarding possible sleep apnea. Patient has had episodes of memory loss and is referred per neurology.. He does snore and has witnessed apneas, .    Patient s Reason for visit  Prabhakar Randall main reason for visit: Neurology dr set this up  Patient states problem(s) started: 3 months ago  Prabhakar Randall's goals for this visit: None           Assessment and Plan:     Summary Sleep Diagnoses:  Suspected sleep apnea-snoring, witnessed apneas, stop Bang 4.     Comorbid Diagnoses:  HTN  BPM  Memory loss  HLD  Factor V leiden      Summary Recommendations:  Lab study for suspected sleep apnea.   No orders of the defined types were placed in this encounter.        Summary Counseling:    Sleep Testing Reviewed  Obstructive Sleep Apnea Reviewed  Complications of Untreated Sleep Apnea Reviewed      Medical Decision-making:   Educational materials provided in instructions    Total time spent reviewing medical records, history and physical examination, review of previous testing and interpretation as well as documentation on this date:45 min    CC: Harrison Jimenez          History of Present Illness:     Past Sleep Evaluations:    SLEEP-WAKE SCHEDULE:     Work/School Days: Patient goes to school/work: No   Usually gets into bed at 11 pm  Takes patient about Minute to fall asleep  Has trouble falling asleep 1 nights per week  Wakes up in the middle of the night 1 times.  Wakes up  due to Use the bathroom  He has trouble falling back asleep None times a week.   It usually takes 2 minutes to get back to sleep  Patient is usually up at 7 am  Uses alarm: No    Weekends/Non-work Days/All Other Days:  Usually gets into bed at 11 pm   Takes patient about 1 minute to fall asleep  Patient is usually up at 7 am  Uses alarm: No    Sleep Need  Patient gets  7 to 8 hours sleep on average   Patient thinks he needs about 7 hrs sleep    Prabhakar Randall prefers to sleep in this position(s): Back;Side;Head Elevated   Patient states they do the following activities in bed:      Naps  Patient takes a purposeful nap 1 per day times a week and naps are usually 30 minutes in duration  He feels better after a nap: Yes  He dozes off unintentionally 5 days per week  Patient has had a driving accident or near-miss due to sleepiness/drowsiness: No      SLEEP DISRUPTIONS:    Breathing/Snoring  Patient snores:Yes  Other people complain about his snoring: Yes  Patient has been told he stops breathing in his sleep:Yes  He has issues with the following: Morning mouth dryness;Stuffy nose when you wake up    Movement:  Patient gets pain, discomfort, with an urge to move:  No  It happens when he is resting:  No  It happens more at night:  No  Patient has been told he kicks his legs at night:  No     Behaviors in Sleep:  Prabhakar Randall has experienced the following behaviors while sleeping:    He has experienced sudden muscle weakness during the day: No      Is there anything else you would like your sleep provider to know:          CAFFEINE AND OTHER SUBSTANCES:    Patient consumes caffeinated beverages per day:  1  Last caffeine use is usually: 8 am  List of any prescribed or over the counter stimulants that patient takes: None  List of any prescribed or over the counter sleep medication patient takes: None  List of previous sleep medications that patient has tried: None  Patient drinks alcohol to help them sleep: No  Patient  drinks alcohol near bedtime: No    Family History:  Patient has a family member been diagnosed with a sleep disorder: No            SCALES:    EPWORTH SLEEPINESS SCALE         3/26/2024    11:51 AM    Union Sleepiness Scale ( VANDA Lora  [id number]<br>ESS - [place]/English - Final version - 21 Nov 07 - [facility] [place].)   Sitting and reading Slight chance of dozing   Watching TV Slight chance of dozing   Sitting, inactive in a public place (e.g. a theatre or a meeting) Would never doze   As a passenger in a car for an hour without a break Would never doze   Lying down to rest in the afternoon when circumstances permit Slight chance of dozing   Sitting and talking to someone Would never doze   Sitting quietly after a lunch without alcohol Slight chance of dozing   In a car, while stopped for a few minutes in traffic Would never doze   Union Score (MC) 4   Union Score (Sleep) 4         INSOMNIA SEVERITY INDEX (KEN)          3/26/2024    11:35 AM   Insomnia Severity Index (KEN)   Difficulty falling asleep 0   Difficulty staying asleep 1   Problems waking up too early 0   How SATISFIED/DISSATISFIED are you with your CURRENT sleep pattern? 1   How NOTICEABLE to others do you think your sleep problem is in terms of impairing the quality of your life? 3   How WORRIED/DISTRESSED are you about your current sleep problem? 0   To what extent do you consider your sleep problem to INTERFERE with your daily functioning (e.g. daytime fatigue, mood, ability to function at work/daily chores, concentration, memory, mood, etc.) CURRENTLY? 0   KEN Total Score 5       Guidelines for Scoring/Interpretation:  Total score categories:  0-7 = No clinically significant insomnia   8-14 = Subthreshold insomnia   15-21 = Clinical insomnia (moderate severity)  22-28 = Clinical insomnia (severe)  Used via courtesy of www.ClickFoxth.va.gov with permission from Lv Joiner PhD., [facility]      STOP BANG         3/28/2024    10:48  "AM   STOP BANG Questionnaire (  2008, the American Society of Anesthesiologists, Inc. [employer].)   Neck Cir (cm) Clinic: 40 cm   B/P Clinic: 128/90   BMI Clinic: 30.27         GAD7         No data to display                    CAGE-AID         No data to display                  CAGE-AID reprinted with permission from the [place] Medical Journal, TRENT Silva. and ARMOND Dumont, \"Conjoint screening questionnaires for alcohol and drug abuse\" [place] Medical Journal 94: 135-140, 1995.      PATIENT HEALTH QUESTIONNAIRE-9 (PHQ - 9)         No data to display                  Developed by Rosanna Basurto, Pepper Tineo, Shahram Coleman and colleagues, with an educational jack from [employer] No permission required to reproduce, translate, display or distribute.        Allergies:    Allergies   Allergen Reactions    Oxycodone Other (See Comments)     Other reaction(s): hypotension       Medications:    Current Outpatient Medications   Medication Sig Dispense Refill    aspirin 81 MG EC tablet Take 81 mg by mouth daily      atenolol (TENORMIN) 50 MG tablet Take 1 tablet by mouth daily      atorvastatin (LIPITOR) 40 MG tablet Take 1 tablet by mouth daily      losartan (COZAAR) 50 MG tablet Take 50 mg by mouth daily      tetrahydrozoline (VISINE) 0.05 % ophthalmic solution Place 1 drop into both eyes 3 times daily as needed         Problem List:  There are no problems to display for this patient.       Past Medical/Surgical History:  No past medical history on file.  No past surgical history on file.    Social History:  Social History     Socioeconomic History    Marital status:      Spouse name: Not on file    Number of children: Not on file    Years of education: Not on file    Highest education level: Not on file   Occupational History    Not on file   Tobacco Use    Smoking status: Never    Smokeless tobacco: Never   Vaping Use    Vaping Use: Never used   Substance and Sexual " "Activity    Alcohol use: Yes     Comment: once a month    Drug use: Not on file    Sexual activity: Not on file   Other Topics Concern    Not on file   Social History Narrative    Not on file     Social Determinants of Health     Financial Resource Strain: Not on file   Food Insecurity: Not on file   Transportation Needs: Not on file   Physical Activity: Not on file   Stress: Not on file   Social Connections: Not on file   Interpersonal Safety: Not on file   Housing Stability: Not on file       Family History:  No family history on file.    Review of Systems:  A complete review of systems reviewed by me is negative with the exeption of what has been mentioned in the history of present illness.        Physical Examination:  Vitals: BP (!) 128/90   Pulse 56   Ht 1.753 m (5' 9\")   Wt 93 kg (205 lb)   SpO2 94%   BMI 30.27 kg/m    BMI= Body mass index is 30.27 kg/m .    Neck Cir (cm): 40 cm      GENERAL APPEARANCE: healthy, alert, and no distress  EYES: Eyes grossly normal to inspection, PERRL, and conjunctivae and sclerae normal  HENT: nose and mouth without ulcers or lesions and oropharynx crowded  NECK: no adenopathy, no asymmetry, masses, or scars, and trachea midline and normal to palpation  RESP: lungs clear to auscultation - no rales, rhonchi or wheezes  CV: regular rates and rhythm, normal S1 S2, no S3 or S4, and no murmur, click or rub  MS: extremities normal- no gross deformities noted  PSYCH: mentation appears normal and affect normal/bright  Mallampati Class: II.  Tonsillar Stage: 3  extending beyond pillars.         Data: All pertinent previous laboratory data reviewed     Recent Labs   Lab Test 01/25/24  1630      POTASSIUM 4.5   CHLORIDE 103   CO2 25   ANIONGAP 11   *  158*   BUN 21.4   CR 1.01   TYREL 9.4       Recent Labs   Lab Test 01/25/24  1630   WBC 8.9   RBC 5.64   HGB 16.3   HCT 49.1   MCV 87   MCH 28.9   MCHC 33.2   RDW 14.6          Recent Labs   Lab Test 01/25/24  1630 " "  PROTTOTAL 7.5   ALBUMIN 4.4   BILITOTAL 1.6*   ALKPHOS 97   AST 26   ALT 15       TSH (uIU/mL)   Date Value   01/25/2024 4.04       No results found for: \"UAMP\", \"UBARB\", \"BENZODIAZEUR\", \"UCANN\", \"UCOC\", \"OPIT\", \"UPCP\"    No results found for: \"IRONSAT\", \"EM57302\", \"YOLA\"    No results found for: \"PH\", \"PHARTERIAL\", \"PO2\", \"VT8NOYCNWFO\", \"SAT\", \"PCO2\", \"HCO3\", \"BASEEXCESS\", \"JANAE\", \"BEB\"    @LABRCNTIPR(phv:4,pco2v:4,po2v:4,hco3v:4,love:4,o2per:4)@    Echocardiology: No results found for this or any previous visit (from the past 4320 hour(s)).    Chest x-ray: No results found for this or any previous visit from the past 365 days.      Chest CT:   CT Chest w/o Contrast 06/22/2023    Narrative  EXAM: CT CHEST wo CONTRAST  LOCATION: [facility]  DATE: 6/22/2023    INDICATION: Thoracic aortic ectasia.  COMPARISON: None.  TECHNIQUE: CT chest without IV contrast. Multiplanar reformats were  obtained. Dose reduction techniques were used.  CONTRAST: None.    FINDINGS:  LUNGS AND PLEURA: Benign calcified granulomas both lungs.    MEDIASTINUM/AXILLAE: Mild aneurysmal dilatation of the ascending  thoracic aorta measuring 4.4 cm. Mild dilatation of the arch  measuring 3.5 cm. Descending thoracic aorta is mildly ectatic  measuring 3.4 cm p.m. mild calcified atheromatous plaque. Normal  heart size. No adenopathy.    CORONARY ARTERY CALCIFICATION: Mild.    UPPER ABDOMEN: Benign left hepatic cyst.    MUSCULOSKELETAL: Mild compression deformity of T5 of indeterminate  age.    Impression  IMPRESSION:  1.  Mild aneurysmal dilatation of the ascending thoracic aorta  measuring 4.4 cm.  2.  Mild dilatation of the arch and descending thoracic aorta  measuring 3.5 cm and 3.4 cm.  3.  Mild compression deformity of T5 of indeterminate age.  4.  Benign sequela of prior granulomatous infection.      PFT: Most Recent [facility] Pulmonary Function Testing    No results found for: \"20001\"  No results found for: \"20002\"  No results " "found for: \"20003\"  No results found for: \"20015\"  No results found for: \"20016\"  No results found for: \"20027\"  No results found for: \"20028\"  No results found for: \"20029\"  No results found for: \"20079\"  No results found for: \"20080\"  No results found for: \"20081\"  No results found for: \"20335\"  No results found for: \"20105\"  No results found for: \"20053\"  No results found for: \"20054\"  No results found for: \"20055\"      Kianna Castle [facility] 3/28/2024           "

## 2024-03-28 ENCOUNTER — OFFICE VISIT (OUTPATIENT)
Dept: SLEEP MEDICINE | Facility: CLINIC | Age: 74
End: 2024-03-28
Attending: PSYCHIATRY & NEUROLOGY
Payer: COMMERCIAL

## 2024-03-28 VITALS
SYSTOLIC BLOOD PRESSURE: 150 MMHG | HEART RATE: 56 BPM | HEIGHT: 69 IN | BODY MASS INDEX: 30.36 KG/M2 | WEIGHT: 205 LBS | DIASTOLIC BLOOD PRESSURE: 93 MMHG | OXYGEN SATURATION: 94 %

## 2024-03-28 DIAGNOSIS — R41.3 EPISODIC MEMORY LOSS: ICD-10-CM

## 2024-03-28 DIAGNOSIS — R29.818 SUSPECTED SLEEP APNEA: Primary | ICD-10-CM

## 2024-03-28 DIAGNOSIS — G45.4 TRANSIENT GLOBAL AMNESIA: ICD-10-CM

## 2024-03-28 PROCEDURE — 99203 OFFICE O/P NEW LOW 30 MIN: CPT | Performed by: PHYSICIAN ASSISTANT

## 2024-03-28 NOTE — NURSING NOTE
Weight management plan: Patient was referred to their PCP to discuss a diet and exercise plan.     Prabhakar to follow up with Primary Care provider regarding elevated blood pressure.

## 2024-03-28 NOTE — PATIENT INSTRUCTIONS
" [facility]   Clinic Office Hours:  Monday-- Thursday   8:00 am -- 4:30 pm CST  Friday  8:00 am-3:30 pm [facility]  Clinic Numbers- [phone]  Sleep Lab Address   [address] [place] [id number]  Sleep Lab Phone: [phone]    Your sleep study has been scheduled for: 7/18/24           Sleep Lab Telephone: [phone]    Thank you for choosing [facility]   Please take your time to read through this information.  This information has been designed to help you understand what it takes to begin your journey to a better night's rest. The contents of this packet include:  Study Preparation Instructions  An outline of your Sleep Study's Procedures.    Our technical staff will accommodate you as much as they are able. However, if you have special needs during the night in which you require a Personal Care Attendant, please make arrangements to bring that person along with you and notify us ahead of time by calling [phone].        Upon arrival please park in the west side parking lot near the Emergency Department Entrance   Enter through the \"Emergency Department\" doors and check in at the Emergency Department desk. Let them know you are here for a sleep study.  Please bring your insurance card and photo ID to the study with you.  You will receive a confirmation call 48 hours prior to your study time. If you do not receive a confirmation call, please call [phone].  If you need to cancel or reschedule for any reason, please contact us 24 hours before your scheduled appointment at [phone].  Please make arrangements to follow-up with your specialist to review your sleep study.  We hope to make your night with us as comfortable and as pleasant as possible. If you have any questions after reading through this packet, please feel free to call us.        TO PREPARE FOR YOUR SLEEP STUDY, PLEASE FOLLOW THE FOLLOWING GUIDELINES:    Please have the " next day available for continued testing in the event this is necessary.    Please bathe and wash your hair the day of the test. Hair should be clean, dry, and free from excessive oils, gels or sprays.  Upon arrival, men should be clean shaven (no stubble). If you have a mustache, goatee, or beard, it is not necessary to shave it off.  Bring comfortable 2-piece sleepwear.  If you prefer, you can bring your own pillow, blanket, orthopedic device, etc.  Arise at your regular time on the morning of your study and DO NOT NAP during the day.  Avoid stimulants (coffee, tea, chocolate, soda pop, energy drinks, any other caffeine drinks) after noon on the day of the study.  Eat a normal evening meal before you arrive for your study.   9.  If you prefer a specific snack before bedtime, please bring it along. We have a refrigerator and a microwave available.  10. Continue to take all prescribed medications unless otherwise instructed by your provider.  11. Bring medications for a 24-hour period, as well as any medications prescribed specifically for your sleep study.  Do not take until the sleep staff advises you to take the medications.   12. If you are incontinent please bring your Adult pads, depends or diapers.  13. Feel free to bring your own toiletries (Our lab will have them if you forget).  14. Please DO NOT wear gel nail polish or gel covering, it is ideal not to have any polish on at all, this can cause discrepancies to the equipment that is utilized to perform your sleep study.  15. If you use a C-PAP Machine, please refrain from using it for 3 days ( 72 hours) prior to the sleep study.       1. Arrival      Please arrive at [facility] at 8:00 P.M. to complete any necessary paperwork.  2. Interview      After settling into your room, your technologist will explain what will happen during your study. Please feel free to ask any questions about your study. At this time, your  technologist will collect your medication list and sleep diary.  3. Patient Set-up      The entire process is non-invasive and painless. We will be measuring your head and placing electrodes on your head and face. This will allow us to monitor your sleep. In addition, we will place monitors and sensors on your body that will monitor your breathing, limb movements, snoring and heart rate. Your set-up will take about 45 minutes to 1 hour to complete.  Lights Out  When it is time for bed, your technologist will help you get comfortable. Comfort is essential for a good night's sleep. If you are uncomfortable in any way, please tell your technologist. Before turning out the lights, your technologist will check your equipment. These checks will help you to have the best study possible. After the lights are turned off, you are free to sleep in whatever position is most comfortable to you. Please note: at some point during the night, you will be asked to sleep on your back. If you are unable to sleep on your back, please let your technologist know. Also, even though you will be connected to wires, you will be able to use the restroom when needed.  **Remember: this is a medical test so phones and television will be turned off for this test.  5. Lights On  The technologist will allow you to sleep in until 6:00 - 7:00 A.M. If you have a specific wake-up time requirement, please notify the technologist so that he or she may accommodate your needs. All the water-soluble conduction gel, electrodes and monitoring devices are easily removed. Upon completion, you will want to shower before you start your day. We do supply soap, shampoo and towels, though, if you prefer to bring your own supplies, please feel free to do so. Please be aware that your technologist will not be able to disclose any information regarding the findings of your study. This information will come from your provider at your follow-up appt.  Please schedule  follow up appointment two weeks after your sleep study, if a follow up has not already been scheduled for your results.       CANCELLATION / RESCHEDULING    Our care team at [facility], strives to provide exceptional - high quality care to all of our customers.     We have set aside our technical experts time and a room especially for you to perform your sleep study.     Working together to serve your comprehensive sleep health needs is very important to us. However, we do understand that changing an appointment is sometimes necessary.    If you find that you are unable to make your scheduled appointment, please contact us directly at least 24-hours prior to your scheduled sleep study at [phone]. If you are greeted by our answering system, please spell your name, give your date of birth and leave a detailed message. If you are calling the day of your sleep study and it is after 4:30, please call [phone] and leave a detailed message.     If you cancel with less than a 24-hour notice or do not show up for your sleep study without prior notification, we will send a letter to your referring provider and we will contact you to assist in rescheduling your study.            Thank you for choosing [facility] for your sleep health needs.    PLEASE BE AWARE [facility] ARE NOT RESPONSIBLE FOR ANY ITEMS LOST OR LEFT DURING YOUR STAY.      The problem of recurrent insomnia is discussed. Avoidance of caffeine sources is strongly encouraged. Sleep hygiene issues are reviewed. The use of sedative hypnotics for temporary relief is appropriate; we discussed the addictive nature of these drugs, and a one-time only prescription for prn use of a hypnotic is given, to use no more than 3 times per week for 2-3 weeks.

## 2024-04-26 ENCOUNTER — TELEPHONE (OUTPATIENT)
Dept: NEUROLOGY | Facility: CLINIC | Age: 74
End: 2024-04-26
Payer: COMMERCIAL

## 2024-04-26 NOTE — TELEPHONE ENCOUNTER
"[facility] Call Center    Phone Message    May a detailed message be left on voicemail: yes     Reason for Call: Other: Patient's wife Sarika called stating patient \"had another episode recently\", and that he was seen in two hospitals. She wonders if Dr. Jimenez can get the sleep study results before appointment on 5/3/2024, sleep study scheduled for 4/29/2024. Sarika is requesting a call back to discuss next steps stating she was concerned patient had multiple episodes. She can be reached at [phone].     Action Taken: Message routed to:  Other: [facility]    Travel Screening: Not Applicable                                                                   "

## 2024-04-29 ENCOUNTER — THERAPY VISIT (OUTPATIENT)
Dept: SLEEP MEDICINE | Facility: CLINIC | Age: 74
End: 2024-04-29
Attending: PHYSICIAN ASSISTANT
Payer: COMMERCIAL

## 2024-04-29 DIAGNOSIS — R29.818 SUSPECTED SLEEP APNEA: ICD-10-CM

## 2024-04-29 PROCEDURE — 95810 POLYSOM 6/> YRS 4/> PARAM: CPT | Performed by: INTERNAL MEDICINE

## 2024-04-29 ASSESSMENT — SLEEP AND FATIGUE QUESTIONNAIRES
HOW LIKELY ARE YOU TO NOD OFF OR FALL ASLEEP WHEN YOU ARE A PASSENGER IN A CAR FOR AN HOUR WITHOUT A BREAK: WOULD NEVER DOZE
HOW LIKELY ARE YOU TO NOD OFF OR FALL ASLEEP IN A CAR, WHILE STOPPED FOR A FEW MINUTES IN TRAFFIC: WOULD NEVER DOZE
HOW LIKELY ARE YOU TO NOD OFF OR FALL ASLEEP WHILE SITTING QUIETLY AFTER LUNCH WITHOUT ALCOHOL: SLIGHT CHANCE OF DOZING
HOW LIKELY ARE YOU TO NOD OFF OR FALL ASLEEP WHILE WATCHING TV: SLIGHT CHANCE OF DOZING
HOW LIKELY ARE YOU TO NOD OFF OR FALL ASLEEP WHILE LYING DOWN TO REST IN THE AFTERNOON WHEN CIRCUMSTANCES PERMIT: MODERATE CHANCE OF DOZING
HOW LIKELY ARE YOU TO NOD OFF OR FALL ASLEEP WHILE SITTING INACTIVE IN A PUBLIC PLACE: WOULD NEVER DOZE
HOW LIKELY ARE YOU TO NOD OFF OR FALL ASLEEP WHILE SITTING AND READING: SLIGHT CHANCE OF DOZING
HOW LIKELY ARE YOU TO NOD OFF OR FALL ASLEEP WHILE SITTING AND TALKING TO SOMEONE: WOULD NEVER DOZE

## 2024-04-29 NOTE — TELEPHONE ENCOUNTER
Called and spoke with patient's spouse, who is just making sure Dr. Jimenez is aware of some recent episodes (that have occurred outside the [facility] system).    Writer conveyed that we do have access to most encounters through care everywhere, but also advised that with 5/3 appointment upcoming, that would be the best time to assess/discuss and get feedback from Dr. Jimenez.    Mandi states understanding (no Ctc appearing on file, and no patient specific information given; this Ctc should be gathered at upcoming appointment).    Juan Daniel Tao RN, BSN  [facility]

## 2024-05-01 ENCOUNTER — MYC MEDICAL ADVICE (OUTPATIENT)
Dept: SLEEP MEDICINE | Facility: CLINIC | Age: 74
End: 2024-05-01
Payer: COMMERCIAL

## 2024-05-01 DIAGNOSIS — G47.33 OSA (OBSTRUCTIVE SLEEP APNEA): Primary | ICD-10-CM

## 2024-05-01 LAB — SLPCOMP: NORMAL

## 2024-05-01 NOTE — PROGRESS NOTES
"In person evaluation    HPI  2/2/2024, in person consultation  5/3/2024, in person visit      73-year-old being evaluated neurologically for:  Memory loss/transient memory difficulty    CT head 4/26/2024  There is a well-delineated hyperattenuated mass attached to the anterosuperior portion of the third ventricle.         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   2.  No hemorrhage  3.  No acute stroke  4.  No ventriculomegaly    Prolactin level 16 (4/26/2024)  Altered mental status change see ED [facility] ER 4/26/2024  Patient had been turkey hunting and had gotten up at 4:30 AM, spell happened at desk so he is quite sleep deprived  He just come down the heel and he was confused about events but he was awake alert talking and walking  They got in the car to drive to the [facility] and after being in the car 30 minutes seem to be coming around himself  The patient himself says that he \"woke up when he got to the [facility]\" which was an hour and 45 minutes later but family member who is with him says it was only 30 minutes of spell  All of these happen when he is sleep deprived  Finally after the workup at the hospital got back to the hotel at 3 AM went into a deep sleep and was difficult to arouse      Sleep study 4/29/2024 per note patient at high risk for obstructive sleep apnea,  Patient's sleep study showed \"severe sleep apnea\" sleep specialist recommended in lab titration study versus home with auto CPAP titration study.    I feel that getting the sleep disorder treated and seeing if the spells become less frequent would be the first choice.    And a conference with patient and family members about the above and I feel that we should first treat the sleep disorder before labeling him with any type of seizures      A.  Memory loss/transient memory difficulty repetitive        Winter 2023 while staying in [place] (2 hours \"amnesia\"), this " episode may have lasted as long as 12 hours kind of vague difficulty with concentratio       Spell 1/10/2024 (30 minutes amnesia), was able to drive and do tasks but was a little bit unsure of where they were at and where they were going on a trip driving south       Spell 2024 (30 minutes), forgot that he had a blood draw the next day       Spell 2024 (30 minutes transient), effort he took the hunting blinds down without with others.  No actual loss of consciousness            Patient was in [place] and evaluated for transient memory difficulty.          Spell consists of not remembering what he is doing.        Gives limited one-word answers when family asks him questions        Symptoms last about 30 minutes at a time            Has been more sleep deprived recently        Significant hearing loss    MoCA  2024,    29 out of 30    B.  TIA 2024    C.  Vascular risk factors        Hypertension/hyperlipidemia/TIA/factor V Leiden      Past medical history  TIA 2024  Hypertension  Hyperlipidemia  Factor V Leiden  BPH  Arthritis of the knee  Cluster versus migraine headaches times years more right-sided  Hearing loss        Habits  Past smoker quit 1984  Alcohol once per month rare  Played hockey and baseball but does not recall any significant head trauma      Family history  Father heart disease  Mother may have had memory loss in her 90s  at age 100  Sister aortic aneurysm            Workup  Laboratory data 2016  Factor II gene mutation not detected  Cardiolipin antibody negative, Antithrombin III antibody negative Lupus anticoagulant negative  Beta-2 glycoprotein negative, protein C and protein S not deficient    CT scan head 1/10/2024  1. There is no evidence of acute intracranial abnormality.   2.  Mild brain volume loss.   3.  There are bilateral changes of chronic small vessel  ischemic disease in the periventricular deep white matter.   CTA head and neck 1/10/2024  1. Normal  CTA. No focal stenosis, dissection or emergent large vessel  occlusion identified.   2. Incidental note is made of medial deviation of the internal carotid arteries more pronounced on the left than the right.       This is  a normal variant.   MRI brain 1/25/2024  1.  No acute intracranial abnormality.  2.  Minimal age-related generalized parenchymal volume loss.       No particular lobar predominance.  Echo 1/25/2024, ejection fraction 55-60%, left atrium normal size  EEG 2/1/2024, 9.5-10 Hz PDR,  Normal awake and drowsy EEG,    CT head 4/26/2024  There is a well-delineated hyperattenuated mass attached to the anterosuperior portion of the third ventricle.         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   2.  No hemorrhage  3.  No acute stroke  4.  No ventriculomegaly    Laboratory data review                            1/2024            4/626/24  NA/K              139/4.5            139/4.2  BUN/Cr          21.4/1.01          22/1.01  GLU               158                   121     AST                26  WBC/HGB      8.9/16.3          PLTs               374,000    B12                 868  TSH                4.04  Folate             27.1  Prolactin                                    60  4-15      MoCA  2/2/2024,    29 out of 30    Exam    Review of systems  Pertinent positives negatives  Has possibly migraines or cluster headaches for years on the right side  Hearing loss and ringing in the ears  Excessive sleepiness all the time  The memory trouble as above  Hearing loss     Otherwise unremarkable      General exam  Blood pressure 143/93, pulse 60  Alert orient x 3  Lungs clear  Heart rate regular  Abdomen soft  Symmetrical pulses  No edema the feet    Neurologic exam  Alert orient x 3  Prosody speech normal  Naming normal  Comprehension normal  Repetition normal  No aphasia  No neglect    MoCA  2/2/2024,    29 out of 30    Cranial nerves II  "through XII  No ophthalmoplegia  No nystagmus  Visual fields intact  Face symmetrical  Tongue twisters good  Does have hearing loss even with his hearing aid in  Mild decreased blink    Upper extremities  No drift  No tremor  Normal finger-nose    Lower extremities  Distal proximal strength good    Reflexes symmetrical    Gait  Normal    Assessment/plan    1.  Memory loss/transient memory difficulty repetitive       Spell 1/25/2024 (30 minutes)       Spell 1/10/2024 (30 minutes amnesia)       Winter 2023 while staying in [place] (2 hours \"amnesia\")          Patient was in [place] and evaluated for transient memory difficulty.          Spell consists of not remembering what he is doing.        Gives limited one-word answers when family asks him questions        Symptoms last about 30 minutes at a time            Has been more sleep deprived recently    MoCA  2/2/2024,    29 out of 30    2.  Vascular risk factors        Hypertension/hyperlipidemia/TIA/factor V Leiden      3.  Severe sleep apnea       Excessive fatigue and tiredness question sleep apnea    Diagnosis  Episodic memory loss  Questionable TGA  Severe sleep apnea  Multifocal subcortical white matter changes on MRI    Rule out sleep apnea with sleep study    Sleep study 4/29/2024 per note patient at high risk for obstructive sleep apnea,  Patient's sleep study showed \"severe sleep apnea\" sleep specialist recommended in lab titration study versus home with auto CPAP titration study.    I feel that getting the sleep disorder treated and seeing if the spells become less frequent would be the first choice.    And a conference with patient and family members about the above and I feel that we should first treat the sleep disorder before labeling him with any type of seizures    May have some neurodegenerative component but subtle and may be exacerbated by underlying sleep apnea    Follow-up in November 2024    We gave him phone numbers and information to try to " contact the sleep team to expedite getting a sleep mask which I feel is very important  I do not feel that he should be waiting till August to get a sleep mask organized.    Total care time today 37 minutes    As part of the visit today  Reviewed ER note 4/26/2024  Reviewed CT scan 4/26/2024  Reviewed sleep evaluation 4/29/2024 and 5/1/2024   [T: ___] : T[unfilled] [N: ___] : N[unfilled] [AJCC Stage: ____] : AJCC Stage: [unfilled] [de-identified] : 74 y/o female who presents for initial breast medical oncology consultation.\par \par The patient has a history of right breast IDC, ER/MD+, HER2-, [id number], diagnosed in 2015.  The patient underwent a right lumpectomy with SLNB at [facility] with negative margins, 5/5 negative LNs and low Oncotype score.  She was followed by radiation therapy completed 3/12/2015.  The patient resided in [facility] and took Arimidex for 5 years, completed 1/2020. Dr Linares \par \par She was followed with routine mammograms annually.  Mammogram in [facility] 3/2020- abnormal. Could not get care due to the pandemic. [place] and underwent imaging on 10/7/20. A right breast mass at the 8:00 axis, 7 cm from the nipple was discovered and biopsy was recommended.  The biopsy was done on 10/29/20 and pathology revealed an invasive ductal carcinoma, grade 3 ER/MD negative, HER2 IHC: negative.  \par \par The patient underwent a right mastectomy with SLNB by Dr. Nidhi Wilks from [facility] on  12/1/20.  Surgical pathology showed: Invasive ductal carcinoma   (1.7 cm, microscopic measurement), grade 3. No angiolymphatic or perineural invasion identified. Tumor is 3 cm from posterior margin. Monckaberg's arteriosclerosis.  No lymph nodes were discovered during surgery, therefore none submitted.\par \par Pertinent History:\par PMD: Dr. Cox\par PMH: HTN, DM\par Osteoporosis on Alendronate \par The patient takes gabapentin from hip pain\par FH: Daughter with history of sarcoma of the leg; Another daughter history of uterine cancer.  \par The patient is from [facility], she is a , has three children and lives with her daughter and son-in- law\par \par Grand daughter felt communication was very difficult with [facility] and switched care to [facility]. \par \par Fosamax stopped after 5 years\par Patient is very active, spending most day doing chores. Lately has been getting tired easily. Uses cane due to arthritis pain. Independent in ADL \par  [de-identified] : Ms. ROSEANN LU  is here for a follow up appt for right breast TNBC dx 10/2020, s/p right mastectomy.  1/6/2021 started adjuvant dd CMF chemo q2 weeks\par  S/p CMF #2 Here for C3 today, C2 held 1/20/2021 due to thrombocytopenia, C2 rescheduled for 1/27/2021                             \par Grand daughter Jennifer Davenport  on phone today\par CT scans 12/2020 reviewed. Adrenal thickening is non specific. Will repeat CT after completion of chemo. Bone scan MARIE 12/2020\par Tolerating chemo well. She reports mild-moderate fatigue and altered taste x 3-4 days after chemo. She was able to function, maintained PO intake, weight stable. She had mild nausea, took Reglan, no vomiting, no diarrhea or mouth sores. She had mild bone pain/head aches. No neuropathy, no fevers. No hair loss. 1/20/20210 grade 2 thrombocytopenia  PLT 97,000  denies active bleeding. HOLD CHEMO today reschedule for 1/27/2020. Discussed to change to q 3 weeks as her counts didn’t recover in time for q2w regimen.\par 1/27/2021 Reports mild erythema and pain on palpation to 1/20.2021  IV site on dorsum of right hand. No swelling, streaking or  other s/s of infection noted. IV site pain resolved no discoloration noted. Patient did not receive chemo 1/20/21 so no risk of vein irritation from chemo. . advised to apply warm compress  Counts good .000, grade 1 thrombocytopenia,  Hgb 10.2 We will proceed with CMF C3 today 2/17/2021 and continue CMF with Onpro every 3 weeks going forward. \par Patient received Pfizer COVID vaccine on 1/22/21 and 2/11/21 tolerated vaccine well. \par

## 2024-05-03 ENCOUNTER — OFFICE VISIT (OUTPATIENT)
Dept: NEUROLOGY | Facility: CLINIC | Age: 74
End: 2024-05-03
Payer: COMMERCIAL

## 2024-05-03 VITALS
SYSTOLIC BLOOD PRESSURE: 143 MMHG | BODY MASS INDEX: 30.36 KG/M2 | HEART RATE: 60 BPM | WEIGHT: 205 LBS | HEIGHT: 69 IN | DIASTOLIC BLOOD PRESSURE: 93 MMHG

## 2024-05-03 DIAGNOSIS — R41.3 EPISODIC MEMORY LOSS: ICD-10-CM

## 2024-05-03 DIAGNOSIS — G45.4 TRANSIENT GLOBAL AMNESIA: Primary | ICD-10-CM

## 2024-05-03 PROCEDURE — 99214 OFFICE O/P EST MOD 30 MIN: CPT | Performed by: PSYCHIATRY & NEUROLOGY

## 2024-05-03 NOTE — NURSING NOTE
Chief Complaint   Patient presents with    Transient global amnesia     3 month follow up. Was seen twice in the ED for confusion. Completed the sleep study.     Karla De La Cruz LPN on 5/3/2024 at 12:14 PM

## 2024-05-03 NOTE — LETTER
"    5/3/2024         RE: Prabhakar Randall  [address] [facility] [address]        Dear Colleague,    Thank you for referring your patient, Prabhakar Randall, to the [facility] [place]. Please see a copy of my visit note below.    In person evaluation    HPI  2/2/2024, in person consultation  5/3/2024, in person visit      73-year-old being evaluated neurologically for:  Memory loss/transient memory difficulty    CT head 4/26/2024  There is a well-delineated hyperattenuated mass attached to the anterosuperior portion of the third ventricle.         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   2.  No hemorrhage  3.  No acute stroke  4.  No ventriculomegaly    Prolactin level 16 (4/26/2024)  Altered mental status change see ED [facility] 4/26/2024  Patient had been turkey hunting and had gotten up at 4:30 AM, spell happened at desk so he is quite sleep deprived  He just come down the heel and he was confused about events but he was awake alert talking and walking  They got in the car to drive to the [facility] and after being in the car 30 minutes seem to be coming around himself  The patient himself says that he \"woke up when he got to the [facility]\" which was an hour and 45 minutes later but family member who is with him says it was only 30 minutes of spell  All of these happen when he is sleep deprived  Finally after the workup at the hospital got back to the hotel at 3 AM went into a deep sleep and was difficult to arouse      Sleep study 4/29/2024 per note patient at high risk for obstructive sleep apnea,  Patient's sleep study showed \"severe sleep apnea\" sleep specialist recommended in lab titration study versus home with auto CPAP titration study.    I feel that getting the sleep disorder treated and seeing if the spells become less frequent would be the first choice.    And a conference with patient and family members " "about the above and I feel that we should first treat the sleep disorder before labeling him with any type of seizures      A.  Memory loss/transient memory difficulty repetitive        Winter 2023 while staying in [place] (2 hours \"amnesia\"), this episode may have lasted as long as 12 hours kind of vague difficulty with concentratio       Spell 1/10/2024 (30 minutes amnesia), was able to drive and do tasks but was a little bit unsure of where they were at and where they were going on a trip [place]       Spell 2024 (30 minutes), forgot that he had a blood draw the next day       Spell 2024 (30 minutes transient), effort he took the hunting blinds down without with others.  No actual loss of consciousness            Patient was in [place] and evaluated for transient memory difficulty.          Spell consists of not remembering what he is doing.        Gives limited one-word answers when family asks him questions        Symptoms last about 30 minutes at a time            Has been more sleep deprived recently        Significant hearing loss    MoCA  2024,    29 out of 30    B.  TIA 2024    C.  Vascular risk factors        Hypertension/hyperlipidemia/TIA/factor V Leiden      Past medical history  TIA 2024  Hypertension  Hyperlipidemia  Factor V Leiden  BPH  Arthritis of the knee  Cluster versus migraine headaches times years more right-sided  Hearing loss        Habits  Past smoker quit   Alcohol once per month rare  Played hockey and baseball but does not recall any significant head trauma      Family history  Father heart disease  Mother may have had memory loss in her 90s  at age 100  Sister aortic aneurysm            Workup  Laboratory data 2016  Factor II gene mutation not detected  Cardiolipin antibody negative, Antithrombin III antibody negative Lupus anticoagulant negative  Beta-2 glycoprotein negative, protein C and protein S not deficient    CT scan head 1/10/2024  1. " There is no evidence of acute intracranial abnormality.   2.  Mild brain volume loss.   3.  There are bilateral changes of chronic small vessel  ischemic disease in the periventricular deep white matter.   CTA head and neck 1/10/2024  1. Normal CTA. No focal stenosis, dissection or emergent large vessel  occlusion identified.   2. Incidental note is made of medial deviation of the internal carotid arteries more pronounced on the left than the right.       This is  a normal variant.   MRI brain 1/25/2024  1.  No acute intracranial abnormality.  2.  Minimal age-related generalized parenchymal volume loss.       No particular lobar predominance.  Echo 1/25/2024, ejection fraction 55-60%, left atrium normal size  EEG 2/1/2024, 9.5-10 Hz PDR,  Normal awake and drowsy EEG,    CT head 4/26/2024  There is a well-delineated hyperattenuated mass attached to the anterosuperior portion of the third ventricle.         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   2.  No hemorrhage  3.  No acute stroke  4.  No ventriculomegaly    Laboratory data review                            1/2024            4/626/24  NA/K              139/4.5            139/4.2  BUN/Cr          21.4/1.01          22/1.01  GLU               158                   121     AST                26  WBC/HGB      8.9/16.3          PLTs               374,000    B12                 868  TSH                4.04  Folate             27.1  Prolactin                                    60  4-15      MoCA  2/2/2024,    29 out of 30    Exam    Review of systems  Pertinent positives negatives  Has possibly migraines or cluster headaches for years on the right side  Hearing loss and ringing in the ears  Excessive sleepiness all the time  The memory trouble as above  Hearing loss     Otherwise unremarkable      General exam  Blood pressure 143/93, pulse 60  Alert orient x 3  Lungs clear  Heart rate  "regular  Abdomen soft  Symmetrical pulses  No edema the feet    Neurologic exam  Alert orient x 3  Prosody speech normal  Naming normal  Comprehension normal  Repetition normal  No aphasia  No neglect    MoCA  2/2/2024,    29 out of 30    Cranial nerves II through XII  No ophthalmoplegia  No nystagmus  Visual fields intact  Face symmetrical  Tongue twisters good  Does have hearing loss even with his hearing aid in  Mild decreased blink    Upper extremities  No drift  No tremor  Normal finger-nose    Lower extremities  Distal proximal strength good    Reflexes symmetrical    Gait  Normal    Assessment/plan    1.  Memory loss/transient memory difficulty repetitive       Spell 1/25/2024 (30 minutes)       Spell 1/10/2024 (30 minutes amnesia)       Winter 2023 while staying in [place] (2 hours \"amnesia\")          Patient was in [place] and evaluated for transient memory difficulty.          Spell consists of not remembering what he is doing.        Gives limited one-word answers when family asks him questions        Symptoms last about 30 minutes at a time            Has been more sleep deprived recently    MoCA  2/2/2024,    29 out of 30    2.  Vascular risk factors        Hypertension/hyperlipidemia/TIA/factor V Leiden      3.  Severe sleep apnea       Excessive fatigue and tiredness question sleep apnea    Diagnosis  Episodic memory loss  Questionable TGA  Severe sleep apnea  Multifocal subcortical white matter changes on MRI    Rule out sleep apnea with sleep study    Sleep study 4/29/2024 per note patient at high risk for obstructive sleep apnea,  Patient's sleep study showed \"severe sleep apnea\" sleep specialist recommended in lab titration study versus home with auto CPAP titration study.    I feel that getting the sleep disorder treated and seeing if the spells become less frequent would be the first choice.    And a conference with patient and family members about the above and I feel that we should first " treat the sleep disorder before labeling him with any type of seizures    May have some neurodegenerative component but subtle and may be exacerbated by underlying sleep apnea    Follow-up in November 2024    We gave him phone numbers and information to try to contact the sleep team to expedite getting a sleep mask which I feel is very important  I do not feel that he should be waiting till August to get a sleep mask organized.    Total care time today 37 minutes    As part of the visit today  Reviewed ER note 4/26/2024  Reviewed CT scan 4/26/2024  Reviewed sleep evaluation 4/29/2024 and 5/1/2024      Again, thank you for allowing me to participate in the care of your patient.        Sincerely,        megha Jimenez MD

## 2024-05-06 ENCOUNTER — TELEPHONE (OUTPATIENT)
Dept: SLEEP MEDICINE | Facility: CLINIC | Age: 74
End: 2024-05-06
Payer: COMMERCIAL

## 2024-05-06 NOTE — TELEPHONE ENCOUNTER
Patient Returning Call    Reason for call:    No orders for DME. Patient is requesting orders so that he can get scheduled for mask fitting or see message from Dr Johnston 5/1/24.  Two recent Emergency room visits in one evening recently to [place] then in [facility].  Please keep patient updated. His SA is very severe. Thank you    Information relayed to patient:    Sending message to provider    Patient has additional questions:  Yes    What are your questions/concerns:  He just wants to be clear on what his next steps are please.     Who does the patient want to speak with:      Is an  needed?:  No      Could we send this information to you in [email or web address] or would you prefer to receive a phone call?:   Patient would prefer a phone call   Okay to leave a detailed message?: Yes at Home number on file [phone] (home)

## 2024-05-13 ENCOUNTER — DOCUMENTATION ONLY (OUTPATIENT)
Dept: SLEEP MEDICINE | Facility: CLINIC | Age: 74
End: 2024-05-13
Payer: COMMERCIAL

## 2024-05-13 DIAGNOSIS — G47.33 OSA (OBSTRUCTIVE SLEEP APNEA): Primary | ICD-10-CM

## 2024-05-13 NOTE — PROGRESS NOTES
Patient was offered choice of vendor and chose [facility].  Patient Prabhakar Randall was set up at [place]  on May 13, 2024. Patient received a Resmed Airsense 10 Pressures were set at  5-20 cm H2O.   Patient s ramp is 5 cm H2O for Auto and FLEX/EPR is 2.  Patient received a Resmed Mask name: AIRFIT [id number]  Nasal mask size Large, heated tubing and heated humidifier.  Patient has the following compliance requirements: using and visit requirements  Patient has a follow up on TBD with TBD.    Carmen Huber

## 2024-05-16 ENCOUNTER — DOCUMENTATION ONLY (OUTPATIENT)
Dept: SLEEP MEDICINE | Facility: CLINIC | Age: 74
End: 2024-05-16
Payer: COMMERCIAL

## 2024-05-16 NOTE — PROGRESS NOTES
14  DAY STM VISIT    Diagnostic AHI: PS.4         Message left for patient to return call     Assessment: Pt not meeting objective benchmarks for AHI and leak     Action plan: waiting for patient to return call.  and pt to have 30 day STM visit.      Device type: Auto-CPAP    PAP settings:  CPAP MIN CPAP MAX 95TH % PRESSURE EPR RESMED SOFT RESPONSE SETTING   5.0 cm  H20 20.0 cm  H20 12.5 cm  H20  TWO OFF     Mask type:      Objective measures: 14 day rolling measures   COMPLIANCE LEAK AHI AVERAGE USE IN MINUTES   100 % 49.8 12.35 349   GOAL >70% GOAL < 24 LPM GOAL <5 GOAL >240      Patient has the following upcoming sleep appts:  Future Sleep Appointments         Provider Department    2024 9:30 AM (Arrive by 9:15 AM) Gabriel Johnston DO [facility] [place]            Total time spent on accessing and interpreting remote patient PAP therapy data  10 minutes    Total time spent counseling, coaching  and reviewing PAP therapy data with patient  1 minute    [id number]  [id number]  no (3 day STM)

## 2024-05-29 ENCOUNTER — DOCUMENTATION ONLY (OUTPATIENT)
Dept: SLEEP MEDICINE | Facility: CLINIC | Age: 74
End: 2024-05-29
Payer: COMMERCIAL

## 2024-05-29 NOTE — PROGRESS NOTES
14  DAY STM VISIT    Diagnostic AHI: PS.4         Subjective measures: Pt reports feeling amazing and doing wonderful on CPAP. More energy since day 1.  Pt was given my contact information and instructed to reach out with any questions or concerns.       Assessment: Pt not meeting objective benchmarks for leak Patient meeting subjective benchmarks.     Action plan: pt to have 30 day STM visit.      Device type: Auto-CPAP    PAP settings:  DEVICE TYPE CPAP MIN CPAP MAX 95TH % PRESSURE EPR MASK DISPENSED   Auto-CPAP    5.0 cm  H20 20.0 cm  H20 13.7 cm  H20  TWO      Mask type:      Objective measures: 14 day rolling measures   COMPLIANCE LEAK AHI AVERAGE USE IN MINUTES   100 % 42.24 9.39 352   GOAL >70% GOAL < 24 LPM GOAL <5 GOAL >240      Patient has the following upcoming sleep appts:  Future Sleep Appointments         Provider Department    2024 9:30 AM (Arrive by 9:15 AM) Gabriel Johnston DO [facility] [place]            Total time spent on accessing and interpreting remote patient PAP therapy data  10 minutes    Total time spent counseling, coaching  and reviewing PAP therapy data with patient  5 minutes    [id number]  [id number]  no (3 day STM)

## 2024-06-21 ENCOUNTER — ANCILLARY PROCEDURE (OUTPATIENT)
Dept: GENERAL RADIOLOGY | Facility: CLINIC | Age: 74
End: 2024-06-21
Attending: STUDENT IN AN ORGANIZED HEALTH CARE EDUCATION/TRAINING PROGRAM
Payer: COMMERCIAL

## 2024-06-21 ENCOUNTER — OFFICE VISIT (OUTPATIENT)
Dept: FAMILY MEDICINE | Facility: CLINIC | Age: 74
End: 2024-06-21
Payer: COMMERCIAL

## 2024-06-21 ENCOUNTER — HOSPITAL ENCOUNTER (EMERGENCY)
Facility: CLINIC | Age: 74
End: 2024-06-21
Payer: COMMERCIAL

## 2024-06-21 VITALS
HEIGHT: 68 IN | BODY MASS INDEX: 31.9 KG/M2 | HEART RATE: 55 BPM | WEIGHT: 210.5 LBS | RESPIRATION RATE: 18 BRPM | SYSTOLIC BLOOD PRESSURE: 132 MMHG | TEMPERATURE: 97.8 F | OXYGEN SATURATION: 94 % | DIASTOLIC BLOOD PRESSURE: 78 MMHG

## 2024-06-21 DIAGNOSIS — R93.6 ABNORMAL X-RAY OF HAND: Primary | ICD-10-CM

## 2024-06-21 DIAGNOSIS — G47.33 OSA (OBSTRUCTIVE SLEEP APNEA): ICD-10-CM

## 2024-06-21 DIAGNOSIS — I71.21 ANEURYSM OF ASCENDING AORTA WITHOUT RUPTURE (H): ICD-10-CM

## 2024-06-21 DIAGNOSIS — M79.641 BILATERAL HAND PAIN: ICD-10-CM

## 2024-06-21 DIAGNOSIS — M79.642 BILATERAL HAND PAIN: ICD-10-CM

## 2024-06-21 DIAGNOSIS — I10 ESSENTIAL HYPERTENSION: ICD-10-CM

## 2024-06-21 DIAGNOSIS — E78.2 MIXED HYPERLIPIDEMIA: ICD-10-CM

## 2024-06-21 DIAGNOSIS — H57.8A2 FOREIGN BODY SENSATION, LEFT EYE: ICD-10-CM

## 2024-06-21 DIAGNOSIS — H04.203 WATERY EYES: Primary | ICD-10-CM

## 2024-06-21 PROCEDURE — 73130 X-RAY EXAM OF HAND: CPT | Mod: TC | Performed by: RADIOLOGY

## 2024-06-21 PROCEDURE — 99214 OFFICE O/P EST MOD 30 MIN: CPT | Performed by: STUDENT IN AN ORGANIZED HEALTH CARE EDUCATION/TRAINING PROGRAM

## 2024-06-21 RX ORDER — LOSARTAN POTASSIUM 50 MG/1
50 TABLET ORAL DAILY
Qty: 90 TABLET | Refills: 3 | Status: SHIPPED | OUTPATIENT
Start: 2024-06-21

## 2024-06-21 RX ORDER — ATENOLOL 50 MG/1
50 TABLET ORAL DAILY
Qty: 90 TABLET | Refills: 3 | Status: SHIPPED | OUTPATIENT
Start: 2024-06-21

## 2024-06-21 RX ORDER — ATORVASTATIN CALCIUM 40 MG/1
40 TABLET, FILM COATED ORAL DAILY
Qty: 90 TABLET | Refills: 3 | Status: SHIPPED | OUTPATIENT
Start: 2024-06-21

## 2024-06-21 RX ORDER — CARBOXYMETHYLCELLULOSE SODIUM 5 MG/ML
1 SOLUTION/ DROPS OPHTHALMIC 3 TIMES DAILY PRN
COMMUNITY

## 2024-06-21 RX ORDER — RESPIRATORY SYNCYTIAL VIRUS VACCINE 120MCG/0.5
0.5 KIT INTRAMUSCULAR ONCE
Qty: 1 EACH | Refills: 0 | Status: CANCELLED | OUTPATIENT
Start: 2024-06-21 | End: 2024-06-21

## 2024-06-21 RX ORDER — OMEGA-3 FATTY ACIDS/FISH OIL 300-1000MG
200 CAPSULE ORAL EVERY 4 HOURS PRN
COMMUNITY

## 2024-06-21 ASSESSMENT — PAIN SCALES - GENERAL: PAINLEVEL: NO PAIN (0)

## 2024-06-21 NOTE — RESULT ENCOUNTER NOTE
Augustine Randall,     It is a pleasure providing you with medical care. I have received and reviewed your results, and have the following recommendations:     Based on the results of your hand x-ray, you were noted to have mild degenerative changes throughout your hands and wrist. Please follow-up with the hand specialist for additional recommendations.     Additionally, the radiologist did note you have possible periarticular erosions along the right hand which can be finding seen with rheumatoid arthritis which is an autoimmune disease. Because of this, I will also request that you call the lab to schedule appointment for some blood work    Sincerely,     Kiara Posada MD

## 2024-06-21 NOTE — PROGRESS NOTES
Assessment & Plan     Watery eyes  > etiology unclear, suspect allergies, but give patient has had this problem for quite some time and was evaluated by ophthalmology in the past will refer for second opinion   - Adult Eye  Referral; Future    Foreign body sensation, left eye  - Adult Eye  Referral; Future    Bilateral hand pain  > had seen [facility] in the past for hand surgery, suspect   - Orthopedic  Referral; Future  - XR Hand Bilateral G/E 3 Views; Future    LETA (obstructive sleep apnea)  > started using CPAP on 5/13/24, reports he is tolerating it well and it is very helpful for him   - patient reports hsi insurance company is covering 3 months supply for CPAP but patient has an appointment for follow-up with sleep medicine on 8/26, sent updated referral to see if the sleep medicine clinic could see him on 8/13 or a little sooner just so patient will continue to have accessibility to CPAP material   - Adult Sleep Eval & Management Referral; Future    Essential hypertension, currently at goal <140/90   > refilled the following:   - atenolol (TENORMIN) 50 MG tablet; Take 1 tablet (50 mg) by mouth daily  - losartan (COZAAR) 50 MG tablet; Take 1 tablet (50 mg) by mouth daily    Mixed hyperlipidemia  - refilled atorvastatin (LIPITOR) 40 MG tablet; Take 1 tablet (40 mg) by mouth daily    Aneurysm of ascending aorta without rupture (H24)  -refilled atenolol (TENORMIN) 50 MG tablet; Take 1 tablet (50 mg) by mouth daily        Shayy Radfodr is a 73 year old, presenting for the following health issues:  Eye Discharge Both Eyes, Hand Pain, Recheck Medication (Discuss CPAP ), Hypertension, Lipids, and Nasal Problem (Sometimes his nose drips and he would like to discuss)        6/21/2024    11:00 AM   Additional Questions   Roomed by Meagan SUÁREZ LPN   Accompanied by spouse         6/21/2024    11:00 AM   Patient Reported Additional Medications   Patient reports taking the following new  "medications no new meds     History of Present Illness       Reason for visit:  Watery eyes and hands        Chief Complaint   Patient presents with    Eye Discharge Both Eyes    Hand Pain    Recheck Medication     Discuss CPAP     Hypertension    Lipids    Nasal Problem     Sometimes his nose drips and he would like to discuss       Hyperlipidemia Follow-Up    Are you regularly taking any medication or supplement to lower your cholesterol?   Yes- Atorvastatin 40 mg daily  Are you having muscle aches or other side effects that you think could be caused by your cholesterol lowering medication?  No    Hypertension Follow-up    Do you check your blood pressure regularly outside of the clinic? No   Are you following a low salt diet? No  Are your blood pressures ever more than 140 on the top number (systolic) OR more   than 90 on the bottom number (diastolic), for example 140/90? No    Medication Followup of Atenolol 50 mg daily  Taking Medication as prescribed: yes  Side Effects:  None  Medication Helping Symptoms:  yes     Concern - watery eyes   Onset: ongoing for \"quite some time\"  Description: mostly the left eye but it feels like there is a grain of sand in it  Intensity: mild  Progression of Symptoms:  worsening  Accompanying Signs & Symptoms: no pain, no crusty/matter upon waking  Previous history of similar problem: ongoing for \"quite some time\"  Precipitating factors:        Worsened by: none  Alleviating factors:        Improved by: just  the eye drops   Therapies tried and outcome: uses refresh eye drops which helps    Joint Pain  Onset: ongoing  Description:   Location: both hands  Character: Dull ache that is constant but gets worse when he goes to use his hands   Intensity: moderate  Progression of Symptoms: worse  Accompanying Signs & Symptoms:  Other symptoms: numbness, tingling, weakness of the hands, and swelling  History:   Previous similar pain: YES    Precipitating factors:   Trauma or overuse: no " "  Alleviating factors:  Improved by: copper gloves and ibuprofen   Therapies Tried and outcome: copper gloves and 2 ibuprofen before bed makes it better when he wakes up          Medication Followup of Atorvastatin 40 mg daily  Taking Medication as prescribed: yes  Side Effects:  None  Medication Helping Symptoms:  yes  Medication Followup of Losartan 50 mg daily  Taking Medication as prescribed: yes  Side Effects:  None  Medication Helping Symptoms:  yes       ROS:   As above           Objective    /78 (BP Location: Right arm, Patient Position: Sitting, Cuff Size: Adult Large)   Pulse 55   Temp 97.8  F (36.6  C) (Tympanic)   Resp 18   Ht 1.735 m (5' 8.31\")   Wt 95.5 kg (210 lb 8 oz)   SpO2 94%   BMI 31.72 kg/m    Body mass index is 31.72 kg/m .  Physical Exam  Constitutional:       General: He is not in acute distress.  HENT:      Head: Normocephalic and atraumatic.      Right Ear: External ear normal.      Left Ear: External ear normal.   Eyes:      Extraocular Movements: Extraocular movements intact.   Cardiovascular:      Rate and Rhythm: Normal rate and regular rhythm.      Heart sounds: Normal heart sounds.   Pulmonary:      Effort: Pulmonary effort is normal. No respiratory distress.      Breath sounds: Normal breath sounds. No wheezing or rhonchi.   Abdominal:      Palpations: Abdomen is soft. There is no mass.      Tenderness: There is no abdominal tenderness.   Musculoskeletal:         General: Normal range of motion.      Cervical back: Normal range of motion and neck supple.      Comments: Hands appear enlarged (but this is reported to be his baseline)   Has some limited range of motion unable to get tip of thumb to touch tip of pinky finger bilaterally   Decreased strength with squeezing     Skin:     General: Skin is warm.      Findings: No rash.   Neurological:      General: No focal deficit present.      Mental Status: He is alert and oriented to person, place, and time.   Psychiatric:   "       Mood and Affect: Mood normal.              Signed Electronically by: TOPHER BRYANT MD

## 2024-06-24 ENCOUNTER — TELEPHONE (OUTPATIENT)
Dept: SLEEP MEDICINE | Facility: CLINIC | Age: 74
End: 2024-06-24
Payer: COMMERCIAL

## 2024-06-24 NOTE — RESULT ENCOUNTER NOTE
Looks like the patient has not seen their results. Could someone please notify the patient of their results and recommendations?     Thank you so much!     Sincerely,     Kiara Posada MD

## 2024-06-24 NOTE — TELEPHONE ENCOUNTER
General Call      Reason for Call:    Sleep Study ordered per Bruno Gutierrez done on April 29. 2024      What are your questions or concerns:    Patient has been waiting since mid May for results and is booked in Aug regarding Sleep Study ordered per Bruno Gutierrez done on April 29. 2024.     Date of last appointment with provider: 3.28.24 KEZIA WEEKS    Could we send this information to you in [employer] or would you prefer to receive a phone call?:   Patient would prefer a phone call   Okay to leave a detailed message?: Yes at Home number on file [phone] (home)

## 2024-07-24 ENCOUNTER — VIRTUAL VISIT (OUTPATIENT)
Dept: SLEEP MEDICINE | Facility: CLINIC | Age: 74
End: 2024-07-24
Payer: COMMERCIAL

## 2024-07-24 VITALS — WEIGHT: 205 LBS | BODY MASS INDEX: 30.36 KG/M2 | HEIGHT: 69 IN

## 2024-07-24 DIAGNOSIS — G47.33 OSA (OBSTRUCTIVE SLEEP APNEA): Primary | ICD-10-CM

## 2024-07-24 PROCEDURE — 99205 OFFICE O/P NEW HI 60 MIN: CPT | Mod: 95 | Performed by: INTERNAL MEDICINE

## 2024-07-24 ASSESSMENT — SLEEP AND FATIGUE QUESTIONNAIRES
HOW LIKELY ARE YOU TO NOD OFF OR FALL ASLEEP WHILE LYING DOWN TO REST IN THE AFTERNOON WHEN CIRCUMSTANCES PERMIT: MODERATE CHANCE OF DOZING
HOW LIKELY ARE YOU TO NOD OFF OR FALL ASLEEP WHILE SITTING AND TALKING TO SOMEONE: WOULD NEVER DOZE
HOW LIKELY ARE YOU TO NOD OFF OR FALL ASLEEP WHILE SITTING AND READING: SLIGHT CHANCE OF DOZING
HOW LIKELY ARE YOU TO NOD OFF OR FALL ASLEEP WHILE WATCHING TV: SLIGHT CHANCE OF DOZING
HOW LIKELY ARE YOU TO NOD OFF OR FALL ASLEEP WHILE SITTING QUIETLY AFTER LUNCH WITHOUT ALCOHOL: SLIGHT CHANCE OF DOZING
HOW LIKELY ARE YOU TO NOD OFF OR FALL ASLEEP WHILE SITTING INACTIVE IN A PUBLIC PLACE: WOULD NEVER DOZE
HOW LIKELY ARE YOU TO NOD OFF OR FALL ASLEEP IN A CAR, WHILE STOPPED FOR A FEW MINUTES IN TRAFFIC: WOULD NEVER DOZE
HOW LIKELY ARE YOU TO NOD OFF OR FALL ASLEEP WHEN YOU ARE A PASSENGER IN A CAR FOR AN HOUR WITHOUT A BREAK: WOULD NEVER DOZE

## 2024-07-24 ASSESSMENT — PAIN SCALES - GENERAL: PAINLEVEL: NO PAIN (0)

## 2024-07-24 NOTE — PROGRESS NOTES
[facility]   Outpatient Sleep Medicine Follow-up Visit  July 24, 2024    Name: Prabhakar Randall MRN# [id number]   Age: 73 year old YOB: 1950     Date of Consultation: July 24, 2024  Consultation is requested by: Bruno Gutierrez PA-C  [address] [place] [address]  Primary care provider: Elena Lopez           Assessment and Plan:     Sleep Diagnoses:  Severe obstructive sleep apnea on effective CPAP with adequate usage intermittent mild intermittent increase in obstructive events that do not fully explain recent events.  Inconclusive workup thus far on causes for new onset episodic unconsciousness and respiratory events despite effective CPAP use during the night and without convincing evidence for chronic hypoventilation (normal venous pCO2 and bicarbonate).  The fact that these are new in onset suggest a new evolving process.  Episodic upper airway obstruction or new onset central apneas from preexistent or evolving neurologic process should be considered and seizures has not yet been ruled out by previous normal EEG in February 2024 suggesting prolonged recording should be considered to be more definitive.    Comorbid conditions:  Hypertension    Summary Recommendations:  Patient is to use CPAP during all sleep periods during the day or night and should initiate CPAP if he is sleepy and sits in a chair.    If events persist OFF CPAP, oximetry should be performed and patient should be placed on CPAP.  If events persist ON CPAP oximetry should be measured at that time and place in a sitting position with his CPAP and ensure mask is not leaking.    If events persist despite efforts to maintain airway and CPAP use, prolonged EEG recording to exclude seizures is recommended.  Although average mask leak has not been significant, we cannot exclude intermittent mask leak during CPAP usage and we are recommending switching to a full facemask to improve seal.  Repeat sleep study will be performed if these measures are not helpful.  Original sleep study showed complex sleep apnea raising the possibility of these events could be central apneas perhaps due to an unknown defined recent problem not picked up on MRI.  Dr. Zamora will review original overnight respiratory pattern.            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  He also has conscious confusional events with driving without understanding where he is 10-12 minutes 3-4 times.  All of these suggest amnestic or memory problems.     More recently he has had periods of unconsciousness and drooling occurring after or during naps without CPAP (started May 2024) lasting as long as 30 - 60 minutes with unconsciousness to vigorous attempts to arouse him but without precedent motor seizure activity although blood blood was noted in drooling 1 episode. One of these episodes was witnessed during the hospitalization and was associated with drop in oxygen saturation to 70% does not exclude a seizure but does suggest a respiratory cause.      PREVIOUS SLEEP STUDIES:  Date: 420 9/2024           Most Recent SCALES:    EPWORTH SLEEPINESS SCALE WITHIN 1 YEAR WITHIN 10 DAYS   Sitting and reading 1 1   Watching TV 1 1   Sitting, inactive in a public place (theatre or mtg.) 0  0    As a passenger in a car 0 0   Lying down to rest in the afternoon when circumstance permit 2 2   Sitting and talking to someone 0 0   Sitting quietly after lunch without alcohol 1 1   In a car, while stopped for a few minutes in traffic 0 0   TOTAL SCORE 5 5   Normal < 11         0--none    1--mild    2--moderate  3--severe      INSOMNIA SEVERITY INDEX WITHIN 1 YEAR   Difficulty falling asleep 0   Difficult staying asleep 2   Problems waking up to early 0   How SATISFIED/DISSATISFIED are you with your  CURRENT sleep pattern? 2   How NOTICEABLE to others do you think your sleep pattern is in terms of your quality of life? 2   How WORRIED/DISTRESSED are you about your current sleep pattern? 1   To what extent do you consider your sleep problem to INTERFERE with your daily fuctioning(e.g. daytime fatigue, mood, ability to function at work/daily chores, concentration, mood,etc.) CURRENTLY? 2   INSOMNIA SEVERITY INDEX TOTAL SCORE 9    --absence of insomnia (0-7); sub-threshold insomnia (8-14); moderate insomnia (15-21); and severe insomnia (22-28)--          Objective:  CPAP Compliance Targets:   >70% days > 4 hours AHI < 5   30 days ending July 24, 2024  Mask type:  Nose mask   ResMed     Auto-PAP 5.0 - 20.0 cmH2O 30 day usage data:    93% of days with > 4 hours of use. 0/30 days with no use.   Average use 384 minutes per day.   95%ile Leak 35.92 L/min.   CPAP 95% pressure 12.5 cm.   AHI 7.27 events per hour.                   Medications:     Current Outpatient Medications   Medication Sig Dispense Refill    aspirin 81 MG EC tablet Take 81 mg by mouth daily      atenolol (TENORMIN) 50 MG tablet Take 1 tablet (50 mg) by mouth daily 90 tablet 3    atorvastatin (LIPITOR) 40 MG tablet Take 1 tablet (40 mg) by mouth daily 90 tablet 3    carboxymethylcellulose PF (REFRESH PLUS) 0.5 % ophthalmic solution 1 drop 3 times daily as needed for dry eyes      ibuprofen (ADVIL/MOTRIN) 200 MG capsule Take 200 mg by mouth every 4 hours as needed for fever      losartan (COZAAR) 50 MG tablet Take 1 tablet (50 mg) by mouth daily 90 tablet 3    Multiple Vitamin (MULTIVITAMIN ADULT PO)       tetrahydrozoline (VISINE) 0.05 % ophthalmic solution Place 1 drop into both eyes 3 times daily as needed       No current facility-administered medications for this visit.        Allergies   Allergen Reactions    Oxycodone Other (See Comments)     Other reaction(s): hypotension            Problem List:     There is no problem list on file for this  "patient.           Past Medical History:     Does not need 02 supplement at night   No past medical history on file.                Physical Examination:   Objective:  Vitals: Ht 1.753 m (5' 9\")   Wt 93 kg (205 lb)   BMI 30.27 kg/m    BMI= Body mass index is 30.27 kg/m .  No asterixis; 70 minutes 70 minutes alert and oriented  Reported vitals:  There were no vitals taken for this visit.   GENERAL: alert and no distress  EYES: Eyes grossly normal to inspection.  No discharge or erythema, or obvious scleral/conjunctival abnormalities.  RESP: No audible wheeze, cough, or visible cyanosis.    SKIN: Visible skin clear. No significant rash, abnormal pigmentation or lesions.  NEURO: Cranial nerves grossly intact.  Mentation and speech appropriate for age.  PSYCH: Appropriate affect, tone, and pace of words        Copy to: Elena Lopez MD 7/24/2024         Total time spent reviewing medical records including previous testing and interpretation as well as direct patient contact and documentation on this date: 70 minutes  "

## 2024-07-24 NOTE — PROGRESS NOTES
Virtual Visit Details    Type of service:  Video Visit   Video Start Time: 4:30 PM    Video End Time:5:52 PM    Originating Location (pt. Location): Home    Distant Location (provider location):  Off-site  Platform used for Video Visit: Oscar

## 2024-07-24 NOTE — NURSING NOTE
Current patient location: [address] [place] [address]    Is the patient currently in the state of [place]? YES    Visit mode:VIDEO    If the visit is dropped, the patient can be reconnected by: VIDEO VISIT: Text to cell phone:   Telephone Information:   Mobile [phone]       Will anyone else be joining the visit? NO  (If patient encounters technical issues they should call [phone])    How would you like to obtain your AVS? MyChart    Are changes needed to the allergy or medication list? Pt stated no med changes    Are refills needed on medications prescribed by this physician? NO    Reason for visit: RECHECK    Sobia BARBOSA

## 2024-07-25 PROBLEM — R40.4 ALTERED LEVEL OF CONSCIOUSNESS: Status: ACTIVE | Noted: 2024-07-18

## 2024-07-25 PROBLEM — R97.20 HIGH PROSTATE SPECIFIC ANTIGEN (PSA): Status: ACTIVE | Noted: 2017-06-21

## 2024-07-25 PROBLEM — M17.11 ARTHRITIS OF RIGHT KNEE: Status: ACTIVE | Noted: 2023-04-06

## 2024-07-25 PROBLEM — E78.5 HYPERLIPIDEMIA: Status: ACTIVE | Noted: 2019-10-03

## 2024-07-25 PROBLEM — G47.33 OSA ON CPAP: Status: ACTIVE | Noted: 2024-07-18

## 2024-07-25 PROBLEM — I77.810 ASCENDING AORTA DILATATION (H): Status: ACTIVE | Noted: 2023-04-10

## 2024-07-25 PROBLEM — I10 ESSENTIAL HYPERTENSION: Status: ACTIVE | Noted: 2023-04-06

## 2024-07-26 ENCOUNTER — TELEPHONE (OUTPATIENT)
Dept: NEUROLOGY | Facility: CLINIC | Age: 74
End: 2024-07-26
Payer: COMMERCIAL

## 2024-07-26 NOTE — TELEPHONE ENCOUNTER
[facility] Call Center    Phone Message    May a detailed message be left on voicemail: yes     Reason for Call: Pts spouse says she had a problem with waking the pt up, the pt was hospitalized, she would like to speak with the DrLashae because tests other Rosanna are trying to run.    Please call Sarika at [phone] or  [phone] to discuss further.      Action Taken: Message routed to:  Other: [facility]    Travel Screening: Not Applicable     Date of Service:

## 2024-07-29 NOTE — TELEPHONE ENCOUNTER
"Called and spoke with patient's spouse (Sarika Quan on file), who is calling to clinic just to provide an update on recent course of patient's treatment, understands provider is out of clinic, but would like review and insights from neurology perspective.    Patient has had recent ED encounters on 7/17, and other tests/studies conducted in that encounter (MRI is in patient's chart). Also patient has had multiple variations on sleep mask, with additional advise from other providers about also wearing this mask while seated during daytime. Other opinions about there being potential for seizure related activity, though goal was to correct \"severe sleep apnea\" prior to that diagnosis. It appears they are slowly making gains towards helping with sleep apnea; spouse expresses frustration its taken this long, but is now hopeful they have the correct equipment and potentially are helping from that aspect.    MD to please review recent imaging and health care encounters upon return to clinic, and provide insights for steps moving forward from standpoint of neurology.    Juan Daniel Tao RN, BSN  [facility]    "

## 2024-08-05 NOTE — TELEPHONE ENCOUNTER
Called SO, and pt answered.  Sarika out at the moment.     RN reviewed Dr. Jimenez note/recommendations below.     Pt verbalized understanding and states he is feeling really good right now. They did change a couple things with his CPAP mask, and it seemed to have helped.     He had no further questions at this time, and was advised to call back with any questions or concerns.     Marge GARCIA RN, BSN  [facility]

## 2024-08-05 NOTE — TELEPHONE ENCOUNTER
"Reviewed recent scanning of the head  MRI brain 7/17/2024.  1.  No acute intracranial process  2.  Generalized atrophy and small vessel changes  3.  Redemonstrated 7 mm colloid cyst  HEAD CTA: 7/17/2024  1.  Normal CTA [place] [facility] [place].   NECK CTA: 7/17/2024  1.  Normal neck CTA.     Scans above are \"okay\"    Agree with other physicians that treating the sleep apnea is important.  No new recommendations at this time.  Keep follow-up as planned 11/19/2024  Let patient know the above.    megha Jimenez MD on 8/5/2024 at 10:41 AM    "

## 2024-08-26 ENCOUNTER — OFFICE VISIT (OUTPATIENT)
Dept: SLEEP MEDICINE | Facility: CLINIC | Age: 74
End: 2024-08-26
Payer: COMMERCIAL

## 2024-08-26 VITALS
DIASTOLIC BLOOD PRESSURE: 84 MMHG | HEIGHT: 69 IN | OXYGEN SATURATION: 95 % | WEIGHT: 209.2 LBS | HEART RATE: 54 BPM | SYSTOLIC BLOOD PRESSURE: 147 MMHG | BODY MASS INDEX: 30.98 KG/M2 | RESPIRATION RATE: 12 BRPM

## 2024-08-26 DIAGNOSIS — G47.33 OSA (OBSTRUCTIVE SLEEP APNEA): Primary | ICD-10-CM

## 2024-08-26 PROCEDURE — G2211 COMPLEX E/M VISIT ADD ON: HCPCS | Performed by: INTERNAL MEDICINE

## 2024-08-26 PROCEDURE — 99214 OFFICE O/P EST MOD 30 MIN: CPT | Performed by: INTERNAL MEDICINE

## 2024-08-26 ASSESSMENT — SLEEP AND FATIGUE QUESTIONNAIRES
HOW LIKELY ARE YOU TO NOD OFF OR FALL ASLEEP WHILE WATCHING TV: HIGH CHANCE OF DOZING
HOW LIKELY ARE YOU TO NOD OFF OR FALL ASLEEP WHEN YOU ARE A PASSENGER IN A CAR FOR AN HOUR WITHOUT A BREAK: WOULD NEVER DOZE
HOW LIKELY ARE YOU TO NOD OFF OR FALL ASLEEP WHILE LYING DOWN TO REST IN THE AFTERNOON WHEN CIRCUMSTANCES PERMIT: MODERATE CHANCE OF DOZING
HOW LIKELY ARE YOU TO NOD OFF OR FALL ASLEEP WHILE SITTING QUIETLY AFTER LUNCH WITHOUT ALCOHOL: SLIGHT CHANCE OF DOZING
HOW LIKELY ARE YOU TO NOD OFF OR FALL ASLEEP WHILE SITTING AND TALKING TO SOMEONE: WOULD NEVER DOZE
HOW LIKELY ARE YOU TO NOD OFF OR FALL ASLEEP IN A CAR, WHILE STOPPED FOR A FEW MINUTES IN TRAFFIC: WOULD NEVER DOZE
HOW LIKELY ARE YOU TO NOD OFF OR FALL ASLEEP WHILE SITTING AND READING: HIGH CHANCE OF DOZING
HOW LIKELY ARE YOU TO NOD OFF OR FALL ASLEEP WHILE SITTING INACTIVE IN A PUBLIC PLACE: SLIGHT CHANCE OF DOZING

## 2024-08-26 NOTE — NURSING NOTE
Per provider order; CPAP pressures were updated in [employer] website:  08/26/2024, 08:02 AM  by Erika Enriquez  Settings sent to device  Set Mode to CPAP  Set Set pressure to 15.0 cmH2O  Set EPR to Fulltime  Set EPR level to 2  Set Ramp enable to Auto  Set Start pressure to 5.0 cmH2O  Erika Enriquez, CMA

## 2024-08-26 NOTE — PATIENT INSTRUCTIONS
Your Body mass index is 30.89 kg/m .  Weight management is a personal decision.  If you are interested in exploring weight loss strategies, the following discussion covers the approaches that may be successful. Body mass index (BMI) is one way to tell whether you are at a healthy weight, overweight, or obese. It measures your weight in relation to your height.  A BMI of 18.5 to 24.9 is in the healthy range. A person with a BMI of 25 to 29.9 is considered overweight, and someone with a BMI of 30 or greater is considered obese. More than two-thirds of American adults are considered overweight or obese.  Being overweight or obese increases the risk for further weight gain. Excess weight may lead to heart disease and diabetes.  Creating and following plans for healthy eating and physical activity may help you improve your health.  Weight control is part of healthy lifestyle and includes exercise, emotional health, and healthy eating habits. Careful eating habits lifelong are the mainstay of weight control. Though there are significant health benefits from weight loss, long-term weight loss with diet alone may be very difficult to achieve- studies show long-term success with dietary management in less than 10% of people. Attaining a healthy weight may be especially difficult to achieve in those with severe obesity. In some cases, medications, devices and surgical management might be considered.  What can you do?  If you are overweight or obese and are interested in methods for weight loss, you should discuss this with your provider.   Consider reducing daily calorie intake by 500 calories.   Keep a food journal.   Avoiding skipping meals, consider cutting portions instead.    Diet combined with exercise helps maintain muscle while optimizing fat loss. Strength training is particularly important for building and maintaining muscle mass. Exercise helps reduce stress, increase energy, and improves fitness. Increasing  exercise without diet control, however, may not burn enough calories to loose weight.     Start walking three days a week 10-20 minutes at a time  Work towards walking thirty minutes five days a week   Eventually, increase the speed of your walking for 1-2 minutes at time    In addition, we recommend that you review healthy lifestyles and methods for weight loss available through the National Institutes of Health patient information sites:  http://win.niddk.nih.gov/publications/index.htm    And look into health and wellness programs that may be available through your health insurance provider, employer, local community center, or nora club.

## 2024-08-26 NOTE — NURSING NOTE
"Chief Complaint   Patient presents with    CPAP Follow Up       Initial BP (!) 147/84   Pulse 54   Resp 12   Ht 1.753 m (5' 9\")   Wt 94.9 kg (209 lb 3.2 oz)   SpO2 95%   BMI 30.89 kg/m   Estimated body mass index is 30.89 kg/m  as calculated from the following:    Height as of this encounter: 1.753 m (5' 9\").    Weight as of this encounter: 94.9 kg (209 lb 3.2 oz).    Medication Reconciliation: complete  ESS: 10  Neck circumference: 15.75 inches / 40 centimeters.  DME: KATY Enriquez CMA      "

## 2024-08-26 NOTE — PROGRESS NOTES
Additional 15 minutes on the date of service was spent performing the following:    -Preparing to see the patient  -Obtaining and/or reviewing separately obtained history   -Ordering medications, tests, or procedures   -Documenting clinical information in the electronic or other health record     Thank you for the opportunity to participate in the care of Prabhakar Randall.     He is a 73 year old y/o male patient who comes to the sleep medicine clinic for follow up. The patient was diagnosed with LETA on 04/29/2024 (AHI=71.4).  The patient wanted to get a second opinion about his current treatment trajectory for his obstructive sleep apnea.  Patient and his wife are unsure of how long he should be sleeping with the CPAP machine.  They also has some questions about pressure settings and mask issues.     Assessment and Plan:  In summary Prabhakar Randall is a 73 year old year old male who is here for follow up.    1. LETA (obstructive sleep apnea)  I educated the patient and his wife on the underlying pathophysiology of sleep apnea.  I would like him to take scheduled naps with his machine as well as sleeping with it at night for at least 3 months.  I will also narrow the patient's pressure setting to a set pressure of 15 CWP.  If the patient continues to have symptoms, I may consider an in lab titration study at that time.  - COMPREHENSIVE DME    Compliance Download data for 30 Days:  Compliance:100%  Pressure setting:APAP 5-20 cwp  95% pressure:15.1 cwp  Leak:Minimal  Residual AHI:7.1 events per hour  Mask Tolerance:Good  Skin irritation:None  DME:[facility]    Lab reviewed: Discussed with patient.    Cpap Fu Template    Question 8/26/2024  9:14 AM CDT - Filed by Patient   Do you use a CPAP Machine at home? Yes   Overall, on a scale of 0-10 how would you rate your CPAP? 5   Is your mask comfortable? No   If not, why? it keeps moving   Is your mask leaking? Yes   If yes, where do you feel it? cheeks   How many nights  per week does the mask leak? 7   Do you notice snoring with mask on? No   Do you notice gasping arousals with mask on? No   Are you having significant oral or nasal dryness? Yes   Is the pressure setting comfortable? Yes   What type of mask do you use? Nasal Mask   What is your typical bedtime? 11   How long does it take you to go to sleep on PAP therapy? minutes   What time do you typically get out of bed for the day? 530   How many hours on average per night are you using PAP therapy? 6   How many hours are you sleeping per night? 6   Do you feel well rested in the morning? Yes       KEN:  KEN Total Score: 9  Total score - [place]: 10 (8/26/2024  9:15 AM)    Failed to redirect to the Timeline version of the [employer] SmartLink.   Patient Active Problem List   Diagnosis    Altered level of consciousness    Ascending aorta dilatation (H24)    Benign prostatic hyperplasia with urinary obstruction    Benign neoplasm of colon    Essential hypertension    Arthritis of right knee    Hyperlipidemia    LETA on CPAP    High prostate specific antigen (PSA)       No past medical history on file.    No past surgical history on file.    Current Outpatient Medications   Medication Sig Dispense Refill    aspirin 81 MG EC tablet Take 81 mg by mouth daily      atenolol (TENORMIN) 50 MG tablet Take 1 tablet (50 mg) by mouth daily 90 tablet 3    atorvastatin (LIPITOR) 40 MG tablet Take 1 tablet (40 mg) by mouth daily 90 tablet 3    carboxymethylcellulose PF (REFRESH PLUS) 0.5 % ophthalmic solution 1 drop 3 times daily as needed for dry eyes      ibuprofen (ADVIL/MOTRIN) 200 MG capsule Take 200 mg by mouth every 4 hours as needed for fever      losartan (COZAAR) 50 MG tablet Take 1 tablet (50 mg) by mouth daily 90 tablet 3    Multiple Vitamin (MULTIVITAMIN ADULT PO)       tetrahydrozoline (VISINE) 0.05 % ophthalmic solution Place 1 drop into both eyes 3 times daily as needed         Allergies   Allergen Reactions    Oxycodone Other (See  "Comments)     Other reaction(s): hypotension         Physical Exam:  BP (!) 147/84   Pulse 54   Resp 12   Ht 1.753 m (5' 9\")   Wt 94.9 kg (209 lb 3.2 oz)   SpO2 95%   BMI 30.89 kg/m    BMI:Body mass index is 30.89 kg/m .   GEN: NAD,   Head: Normocephalic.  EYES: EOMI  Psych: normal mood, normal affect    Labs/Studies:      Lab Results   Component Value Date    TSH 4.04 01/25/2024     Lab Results   Component Value Date     (H) 01/25/2024     (H) 01/25/2024     Lab Results   Component Value Date    HGB 16.3 01/25/2024     Lab Results   Component Value Date    BUN 21.4 01/25/2024    CR 1.01 01/25/2024     Lab Results   Component Value Date    AST 26 01/25/2024    ALT 15 01/25/2024    ALKPHOS 97 01/25/2024    BILITOTAL 1.6 (H) 01/25/2024     Recent Labs   Lab Test 01/25/24  1630      POTASSIUM 4.5   CHLORIDE 103   CO2 25   ANIONGAP 11   *  158*   BUN 21.4   CR 1.01   TYREL 9.4     I reviewed the efficacy and compliance report from his device. Data summarized on the HPI and the PAP compliance flow sheet.     Patient verbalized understanding of these issues, agrees with the plan and all questions were answered today. Patient was given an opportuntity to voice any other symptoms or concerns not listed above. Patient did not have any other symptoms or concerns.      Gabriel Johnston DO  Board Certified in Internal Medicine and Sleep Medicine    (Note created with Dragon voice recognition and unintended spelling errors and word substitutions may occur)     Audio and visual devices were used for this virtual clinic visit with permission from patient.    "

## 2024-08-26 NOTE — NURSING NOTE
DME orders have been automatically faxed to [facility]. Follow-up appointment with provider has been scheduled. AVS printed and given to patient.  Erika Enriquez CMA

## 2024-11-04 ENCOUNTER — OFFICE VISIT (OUTPATIENT)
Dept: SLEEP MEDICINE | Facility: CLINIC | Age: 74
End: 2024-11-04
Payer: COMMERCIAL

## 2024-11-04 VITALS
BODY MASS INDEX: 31.1 KG/M2 | SYSTOLIC BLOOD PRESSURE: 143 MMHG | HEART RATE: 57 BPM | OXYGEN SATURATION: 96 % | HEIGHT: 69 IN | WEIGHT: 210 LBS | DIASTOLIC BLOOD PRESSURE: 82 MMHG

## 2024-11-04 DIAGNOSIS — E66.811 OBESITY (BMI 30.0-34.9): ICD-10-CM

## 2024-11-04 DIAGNOSIS — G47.33 OSA (OBSTRUCTIVE SLEEP APNEA): Primary | ICD-10-CM

## 2024-11-04 PROCEDURE — G2211 COMPLEX E/M VISIT ADD ON: HCPCS | Performed by: INTERNAL MEDICINE

## 2024-11-04 PROCEDURE — 99213 OFFICE O/P EST LOW 20 MIN: CPT | Performed by: INTERNAL MEDICINE

## 2024-11-04 ASSESSMENT — SLEEP AND FATIGUE QUESTIONNAIRES
HOW LIKELY ARE YOU TO NOD OFF OR FALL ASLEEP WHILE SITTING INACTIVE IN A PUBLIC PLACE: WOULD NEVER DOZE
HOW LIKELY ARE YOU TO NOD OFF OR FALL ASLEEP WHILE LYING DOWN TO REST IN THE AFTERNOON WHEN CIRCUMSTANCES PERMIT: SLIGHT CHANCE OF DOZING
HOW LIKELY ARE YOU TO NOD OFF OR FALL ASLEEP IN A CAR, WHILE STOPPED FOR A FEW MINUTES IN TRAFFIC: WOULD NEVER DOZE
HOW LIKELY ARE YOU TO NOD OFF OR FALL ASLEEP WHEN YOU ARE A PASSENGER IN A CAR FOR AN HOUR WITHOUT A BREAK: WOULD NEVER DOZE
HOW LIKELY ARE YOU TO NOD OFF OR FALL ASLEEP WHILE SITTING QUIETLY AFTER LUNCH WITHOUT ALCOHOL: SLIGHT CHANCE OF DOZING
HOW LIKELY ARE YOU TO NOD OFF OR FALL ASLEEP WHILE SITTING AND READING: SLIGHT CHANCE OF DOZING
HOW LIKELY ARE YOU TO NOD OFF OR FALL ASLEEP WHILE SITTING AND TALKING TO SOMEONE: WOULD NEVER DOZE
HOW LIKELY ARE YOU TO NOD OFF OR FALL ASLEEP WHILE WATCHING TV: MODERATE CHANCE OF DOZING

## 2024-11-04 NOTE — PATIENT INSTRUCTIONS
Prabhakar to follow up with Primary Care provider regarding elevated blood pressure.      Your Body mass index is 31 kg/m .  Weight management is a personal decision.  If you are interested in exploring weight loss strategies, the following discussion covers the approaches that may be successful. Body mass index (BMI) is one way to tell whether you are at a healthy weight, overweight, or obese. It measures your weight in relation to your height.  A BMI of 18.5 to 24.9 is in the healthy range. A person with a BMI of 25 to 29.9 is considered overweight, and someone with a BMI of 30 or greater is considered obese. More than two-thirds of American adults are considered overweight or obese.  Being overweight or obese increases the risk for further weight gain. Excess weight may lead to heart disease and diabetes.  Creating and following plans for healthy eating and physical activity may help you improve your health.  Weight control is part of healthy lifestyle and includes exercise, emotional health, and healthy eating habits. Careful eating habits lifelong are the mainstay of weight control. Though there are significant health benefits from weight loss, long-term weight loss with diet alone may be very difficult to achieve- studies show long-term success with dietary management in less than 10% of people. Attaining a healthy weight may be especially difficult to achieve in those with severe obesity. In some cases, medications, devices and surgical management might be considered.  What can you do?  If you are overweight or obese and are interested in methods for weight loss, you should discuss this with your provider.   Consider reducing daily calorie intake by 500 calories.   Keep a food journal.   Avoiding skipping meals, consider cutting portions instead.    Diet combined with exercise helps maintain muscle while optimizing fat loss. Strength training is particularly important for building and maintaining muscle mass.  Exercise helps reduce stress, increase energy, and improves fitness. Increasing exercise without diet control, however, may not burn enough calories to loose weight.     Start walking three days a week 10-20 minutes at a time  Work towards walking thirty minutes five days a week   Eventually, increase the speed of your walking for 1-2 minutes at time    In addition, we recommend that you review healthy lifestyles and methods for weight loss available through the National Institutes of Health patient information sites:  http://win.niddk.nih.gov/publications/index.htm    And look into health and wellness programs that may be available through your health insurance provider, employer, local community center, or nora club.

## 2024-11-04 NOTE — PROGRESS NOTES
Additional 10 minutes on the date of service was spent performing the following:    -Preparing to see the patient  -Ordering medications, tests, or procedures   -Documenting clinical information in the electronic or other health record     Thank you for the opportunity to participate in the care of Prabhakar Randall.     He is a 73 year old y/o male patient who comes to the sleep medicine clinic for follow up. The patient was diagnosed with LETA on 04/29/2024 (AHI=71.4).  Since the patient's last clinical visit with me, he was able to increase his hours of sleep by 1 more hour at night and also nap with his CPAP during the daytime.  He reports that he is feeling much more alert and rested upon awakening and able to function better.  He also attributed switching to a different mask helped a great deal.     Assessment and Plan:  In summary Prabhakar Randall is a 73 year old year old male who is here for follow up.    1. LETA (obstructive sleep apnea) (Primary)  I congratulated patient on his excellent CPAP usage.  I will keep him the same pressure settings for now.    2. Obesity (BMI 30.0-34.9)  Healthy weigh management is recommended as part of the long term goal to improve LETA. I will give the patient a hand out on the topic in the AVS.    Compliance Download data for 30 days:  Compliance: 100%  Pressure setting: CPAP 15 CWP  Leak: Minimal  Residual AHI: 4.7 events per hour  Mask Tolerance: Good  Skin irritation: None  DME: [facility]    Cpap Fu Template    Question 11/4/2024  3:43 PM CST - Filed by Patient   Do you use a CPAP Machine at home? Yes   Overall, on a scale of 0-10 how would you rate your CPAP? 8   Is your mask comfortable? Yes   Is your mask leaking? No   Do you notice snoring with mask on? No   Do you notice gasping arousals with mask on? No   Are you having significant oral or nasal dryness? No   Is the pressure setting comfortable?    What type of mask do you use? Nasal Mask   What is your typical  "bedtime? 1030   How long does it take you to go to sleep on PAP therapy? 2   What time do you typically get out of bed for the day? 630   How many hours on average per night are you using PAP therapy? 7   How many hours are you sleeping per night? 7   Do you feel well rested in the morning? Yes       KEN:  KEN Total Score: (Patient-Rptd) 1  Total score - [place]: (Patient-Rptd) 5 (11/4/2024  3:45 PM)    Failed to redirect to the Timeline version of the [employer] SmartLink.   Patient Active Problem List   Diagnosis    Altered level of consciousness    Ascending aorta dilatation (H)    Benign prostatic hyperplasia with urinary obstruction    Benign neoplasm of colon    Essential hypertension    Arthritis of right knee    Hyperlipidemia    LETA on CPAP    High prostate specific antigen (PSA)       No past medical history on file.    No past surgical history on file.    Current Outpatient Medications   Medication Sig Dispense Refill    aspirin 81 MG EC tablet Take 81 mg by mouth daily      atenolol (TENORMIN) 50 MG tablet Take 1 tablet (50 mg) by mouth daily 90 tablet 3    atorvastatin (LIPITOR) 40 MG tablet Take 1 tablet (40 mg) by mouth daily 90 tablet 3    carboxymethylcellulose PF (REFRESH PLUS) 0.5 % ophthalmic solution 1 drop 3 times daily as needed for dry eyes      losartan (COZAAR) 50 MG tablet Take 1 tablet (50 mg) by mouth daily 90 tablet 3    Multiple Vitamin (MULTIVITAMIN ADULT PO)          Allergies   Allergen Reactions    Oxycodone Other (See Comments)     Other reaction(s): hypotension         Physical Exam:  BP (!) 143/82   Pulse 57   Ht 1.753 m (5' 9.02\")   Wt 95.3 kg (210 lb)   SpO2 96%   BMI 31.00 kg/m    BMI:Body mass index is 31 kg/m .   GEN: NAD, obese  Head: Normocephalic.  EYES: EOMI  Psych: normal mood, normal affect    Labs/Studies:    I reviewed the efficacy and compliance report from his device. Data summarized on the HPI and the PAP compliance flow sheet.     Patient verbalized " understanding of these issues, agrees with the plan and all questions were answered today. Patient was given an opportuntity to voice any other symptoms or concerns not listed above. Patient did not have any other symptoms or concerns.      Gabriel Johnston DO  Board Certified in Internal Medicine and Sleep Medicine    (Note created with Dragon voice recognition and unintended spelling errors and word substitutions may occur)     Audio and visual devices were used for this virtual clinic visit with permission from patient.

## 2024-11-04 NOTE — NURSING NOTE
"Chief Complaint   Patient presents with    CPAP Follow Up       Initial BP (!) 143/82   Pulse 57   Ht 1.753 m (5' 9.02\")   Wt 95.3 kg (210 lb)   SpO2 96%   BMI 31.00 kg/m   Estimated body mass index is 31 kg/m  as calculated from the following:    Height as of this encounter: 1.753 m (5' 9.02\").    Weight as of this encounter: 95.3 kg (210 lb).    Medication Reconciliation: complete    Neck circumference: 15.75 inches / 40 centimeters.    DME: [facility]    Elisha Bowers CMA on 11/4/2024 at 3:57 PM     "

## 2024-11-04 NOTE — NURSING NOTE
2 year appointment reminder message was created and will be sent out 2 - 3 months prior to next appointment due date. Via [employer]

## 2024-11-18 NOTE — PROGRESS NOTES
In person evaluation    HPI  2/2/2024, in person consultation  5/3/2024, in person visit  11/19/2024, in person visit    73-year-old being evaluated neurologically for:  Memory loss/transient memory difficulty  Severe sleep apnea    Since last seen May 2024    Patient with 2 episodes of confusion since last seen  Did get new mask on his CPAP machine and feels it is made a big difference      ED visit [facility] 7/17/2024  Patient found by wife unresponsive/drooling.  Patient slowly became more responsive and route  Was confused and had slurred speech.  When arrived in ER was able to follow commands and speech was normal.  Patient underwent multiple scans of the brain see official reports below    CT head 7/17/2024  1.  No acute intracranial process  HEAD CTA: 7/17/2024  1.  Normal CTA [place].   NECK CTA: 7/17/2024  1.  Normal neck CTA.   MRI head  1.  No acute intracranial process  2.  Generalized atrophy and microvascular changes  3.  Redemonstrated 7 mm colloid cyst  4.  No pathologic contrast enhancement    Patient has been following with sleep specialist  Reviewed note 7/24/2024  Severe obstructive sleep apnea currently now started on CPAP.  Sleep specialist is concerned that sleep apnea does not completely explain episodic unconscious episodes    With the patient was in the ER in July having one of his episodes he was being monitored and when they turned off the lights in the TV  He is O2 sats dropped to 70  And it was pretty evident that he stops breathing  He has more severe sleep apnea than they thought    Patient did have a second opinion for his sleep disorder  Had adjustments done and the CPAP pressure    The patient was diagnosed with LETA on 04/29/2024 (AHI=71.4).    After adjustments patient is much more alert and rested upon awakening and able to function better.  Some of this was attributed to switching to a different mask which helped a great deal    May have had a minor spell in  "September 2024 was a little bit confused but with the newer mask he is doing even better    He knows current events can talk no focal neurologic deficits    Conference with patient and his son that I still feel that this is from his chronic sleep apnea and it is being treated better at this time          A.  Memory loss/transient memory difficulty repetitive        Winter 2023 while staying in [place] (2 hours \"amnesia\"), this episode may have lasted as long as 12 hours kind of vague difficulty with concentratio       Spell 1/10/2024 (30 minutes amnesia), was able to drive and do tasks but was a little bit unsure of where they were at and where they were going on a trip [place]       Spell 1/25/2024 (30 minutes), forgot that he had a blood draw the next day       Spell 4/26/2024 (30 minutes transient), effort he took the hunting blinds down without with others.  No actual loss of consciousness            Patient was in [place] and evaluated for transient memory difficulty.          Spell consists of not remembering what he is doing.        Gives limited one-word answers when family asks him questions        Symptoms last about 30 minutes at a time            Has been more sleep deprived recently        Significant hearing loss    MoCA  2/2/2024,    29 out of 30    B.  TIA 1/11/2024    C.  Vascular risk factors        Hypertension/hyperlipidemia/TIA/factor V Leiden    D.  Severe sleep apnea        Sleep study 4/29/2024 per note patient at high risk for obstructive sleep apnea,        Patient's sleep study showed \"severe sleep apnea\" sleep specialist recommended in lab titration study versus home with auto CPAP titration study.        I feel that getting the sleep disorder treated and seeing if the spells become less frequent would be the first choice.        And a conference with patient and family members about the above and I feel that we should first treat the sleep disorder before labeling him with any " "type of seizures    Prolactin level 16 (2024)  Altered mental status change seen  ED [facility] 2024  Patient had been turkey hunting and had gotten up at 4:30 AM, spell happened at desk so he is quite sleep deprived  He just come down the heel and he was confused about events but he was awake alert talking and walking  They got in the car to drive to the [facility] and after being in the car 30 minutes seem to be coming around himself  The patient himself says that he \"woke up when he got to the [facility]\" which was an hour and 45 minutes later but family member who is with him says it was only 30 minutes of spell  All of these happen when he is sleep deprived  Finally after the workup at the hospital got back to the hotel at 3 AM went into a deep sleep and was difficult to arouse        Past medical history  TIA 2024  Hypertension  Hyperlipidemia  Factor V Leiden  BPH  Arthritis of the knee  Cluster versus migraine headaches times years more right-sided  Hearing loss    Obstructive sleep apnea      Habits  Past smoker quit   Alcohol once per month rare  Played hockey and baseball but does not recall any significant head trauma      Family history  Father heart disease  Mother may have had memory loss in her 90s  at age 100  Sister aortic aneurysm            Workup  Laboratory data 2016  Factor II gene mutation not detected  Cardiolipin antibody negative, Antithrombin III antibody negative Lupus anticoagulant negative  Beta-2 glycoprotein negative, protein C and protein S not deficient    CT scan head 1/10/2024  1. There is no evidence of acute intracranial abnormality.   2.  Mild brain volume loss.   3.  There are bilateral changes of chronic small vessel  ischemic disease in the periventricular deep white matter.   CTA head and neck 1/10/2024  1. Normal CTA. No focal stenosis, dissection or emergent large vessel  occlusion identified.   2. Incidental note is made of medial deviation " of the internal carotid arteries more pronounced on the left than the right.       This is  a normal variant.   MRI brain 1/25/2024  1.  No acute intracranial abnormality.  2.  Minimal age-related generalized parenchymal volume loss.       No particular lobar predominance.  Echo 1/25/2024, ejection fraction 55-60%, left atrium normal size  EEG 2/1/2024, 9.5-10 Hz PDR,  Normal awake and drowsy EEG,    CT head 4/26/2024  There is a well-delineated hyperattenuated mass attached to the anterosuperior portion of the third ventricle.         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   2.  No hemorrhage  3.  No acute stroke  4.  No ventriculomegaly  CT head 7/17/2024  1.  No acute intracranial process  HEAD CTA: 7/17/2024  1.  Normal CTA [place].   NECK CTA: 7/17/2024  1.  Normal neck CTA.   MRI head  1.  No acute intracranial process  2.  Generalized atrophy and microvascular changes  3.  Redemonstrated 7 mm colloid cyst  4.  No pathologic contrast enhancement      Laboratory data review                        1/2024            4/626/24        7/2024  NA/K              139/4.5            139/4.2         138/4.0  BUN/Cr          21.4/1.01          22/1.01         25/1.0  GLU               158                   121               194-111  AST                26  WBC/HGB      8.9/16.3                                8.5/14.9  PLTs               374,000                               337,000  B12                 868  TSH                4.04  Folate             27.1  Prolactin                                    16  4-15  Ammonia                                                        34    MoCA  2/2/2024,    29 out of 30    Exam    Review of systems  Pertinent positives negatives  Has possibly migraines or cluster headaches for years on the right side  Hearing loss and ringing in the ears  Excessive sleepiness all the time  The memory trouble as  "above  Hearing loss     Otherwise unremarkable      General exam  Blood pressure 144/91, pulse 53  Alert orient x 3  Lungs clear  Heart rate regular  Abdomen soft  Symmetrical pulses  No edema the feet    Neurologic exam  Alert orient x 3  Prosody speech normal  Naming normal  Comprehension normal  Repetition normal  No aphasia  No neglect    MoCA  2/2/2024,    29 out of 30    Cranial nerves II through XII  No ophthalmoplegia  No nystagmus  Visual fields intact  Face symmetrical  Tongue twisters good  Does have hearing loss even with his hearing aid in  Mild decreased blink    Upper extremities  No drift  No tremor  Normal finger-nose    Lower extremities  Distal proximal strength good    Reflexes symmetrical    Gait  Normal    Assessment/plan    1.  Memory loss/transient memory difficulty repetitive       Spell 1/25/2024 (30 minutes)       Spell 1/10/2024 (30 minutes amnesia)       Winter 2023 while staying in [place] (2 hours \"amnesia\")          Patient was in [place] and evaluated for transient memory difficulty.          Spell consists of not remembering what he is doing.        Gives limited one-word answers when family asks him questions        Symptoms last about 30 minutes at a time            Has been more sleep deprived recently    MoCA  2/2/2024,    29 out of 30    2.  Vascular risk factors        Hypertension/hyperlipidemia/TIA/factor V Leiden      3.  Severe sleep apnea       Excessive fatigue and tiredness question sleep apnea    Diagnosis  Episodic memory loss  Questionable TGA  Severe sleep apnea  Multifocal subcortical white matter changes on MRI  Episodic spells of loss of consciousness  Relative bradycardia (pulse of 53)      Patient doing better after adjustment in his CPAP mask for the second time  Adjustment in pressures  Wearing it a lot more often    If having increased episodes in the future could reconsider whether this was epileptic  I am concerned also though that he might have " bradycardia  Might need Holter monitor if he is having further spells    Talk to son and patient  Reviewed all the above  Follow-up in July 2025      Total care time today 30 minutes  The longitudinal plan of care for the diagnosis(es)/condition(s) as documented were addressed during this visit. Due to the added complexity in care, I will continue to support Prabhakar in the subsequent management and with ongoing continuity of care.          As part of the workup today  Reviewed ER notes July 2024  Reviewed CT/CTA/MRI head July 2024  Reviewed sleep specialist note July 24, 2024, second opinion sleep specialist 8/26/2024, 11/4/2024  Reviewed laboratory data

## 2024-11-19 ENCOUNTER — OFFICE VISIT (OUTPATIENT)
Dept: NEUROLOGY | Facility: CLINIC | Age: 74
End: 2024-11-19
Payer: COMMERCIAL

## 2024-11-19 VITALS
SYSTOLIC BLOOD PRESSURE: 144 MMHG | DIASTOLIC BLOOD PRESSURE: 91 MMHG | BODY MASS INDEX: 31.7 KG/M2 | WEIGHT: 214 LBS | HEIGHT: 69 IN | HEART RATE: 53 BPM

## 2024-11-19 DIAGNOSIS — G47.33 OSA ON CPAP: ICD-10-CM

## 2024-11-19 DIAGNOSIS — R41.3 EPISODIC MEMORY LOSS: Primary | ICD-10-CM

## 2024-11-19 PROCEDURE — 99214 OFFICE O/P EST MOD 30 MIN: CPT | Performed by: PSYCHIATRY & NEUROLOGY

## 2024-11-19 PROCEDURE — G2211 COMPLEX E/M VISIT ADD ON: HCPCS | Performed by: PSYCHIATRY & NEUROLOGY

## 2024-11-19 NOTE — NURSING NOTE
Chief Complaint   Patient presents with    Transient global amnesia     6 month follow up. He has had 2 episodes of confusion since last seen. He has a new mask on CPAP machine, he feels this has really made a difference.     Karla De La Cruz LPN on 11/19/2024 at 11:49 AM

## 2024-11-19 NOTE — LETTER
11/19/2024      Prabhakar Randall  [address] [place] [id number]      Dear Colleague,    Thank you for referring your patient, Prabhakar Randall, to the [facility] [place]. Please see a copy of my visit note below.    In person evaluation    HPI  2/2/2024, in person consultation  5/3/2024, in person visit  11/19/2024, in person visit    73-year-old being evaluated neurologically for:  Memory loss/transient memory difficulty  Severe sleep apnea    Since last seen May 2024    Patient with 2 episodes of confusion since last seen  Did get new mask on his CPAP machine and feels it is made a big difference      ED visit [facility] 7/17/2024  Patient found by wife unresponsive/drooling.  Patient slowly became more responsive and route  Was confused and had slurred speech.  When arrived in ER was able to follow commands and speech was normal.  Patient underwent multiple scans of the brain see official reports below    CT head 7/17/2024  1.  No acute intracranial process  HEAD CTA: 7/17/2024  1.  Normal CTA [facility].   NECK CTA: 7/17/2024  1.  Normal neck CTA.   MRI head  1.  No acute intracranial process  2.  Generalized atrophy and microvascular changes  3.  Redemonstrated 7 mm colloid cyst  4.  No pathologic contrast enhancement    Patient has been following with sleep specialist  Reviewed note 7/24/2024  Severe obstructive sleep apnea currently now started on CPAP.  Sleep specialist is concerned that sleep apnea does not completely explain episodic unconscious episodes    With the patient was in the ER in July having one of his episodes he was being monitored and when they turned off the lights in the TV  He is O2 sats dropped to 70  And it was pretty evident that he stops breathing  He has more severe sleep apnea than they thought    Patient did have a second opinion for his sleep disorder  Had adjustments done and the CPAP pressure    The patient was diagnosed with LETA on 04/29/2024  "(AHI=71.4).    After adjustments patient is much more alert and rested upon awakening and able to function better.  Some of this was attributed to switching to a different mask which helped a great deal    May have had a minor spell in September 2024 was a little bit confused but with the newer mask he is doing even better    He knows current events can talk no focal neurologic deficits    Conference with patient and his son that I still feel that this is from his chronic sleep apnea and it is being treated better at this time          A.  Memory loss/transient memory difficulty repetitive        Winter 2023 while staying in [place] (2 hours \"amnesia\"), this episode may have lasted as long as 12 hours kind of vague difficulty with concentratio       Spell 1/10/2024 (30 minutes amnesia), was able to drive and do tasks but was a little bit unsure of where they were at and where they were going on a trip [place]       Spell 1/25/2024 (30 minutes), forgot that he had a blood draw the next day       Spell 4/26/2024 (30 minutes transient), effort he took the hunting blinds down without with others.  No actual loss of consciousness            Patient was in [place] and evaluated for transient memory difficulty.          Spell consists of not remembering what he is doing.        Gives limited one-word answers when family asks him questions        Symptoms last about 30 minutes at a time            Has been more sleep deprived recently        Significant hearing loss    MoCA  2/2/2024,    29 out of 30    B.  TIA 1/11/2024    C.  Vascular risk factors        Hypertension/hyperlipidemia/TIA/factor V Leiden    D.  Severe sleep apnea        Sleep study 4/29/2024 per note patient at high risk for obstructive sleep apnea,        Patient's sleep study showed \"severe sleep apnea\" sleep specialist recommended in lab titration study versus home with auto CPAP titration study.        I feel that getting the sleep disorder " "treated and seeing if the spells become less frequent would be the first choice.        And a conference with patient and family members about the above and I feel that we should first treat the sleep disorder before labeling him with any type of seizures    Prolactin level 16 (2024)  Altered mental status change seen  ED [facility] 2024  Patient had been turkey hunting and had gotten up at 4:30 AM, spell happened at desk so he is quite sleep deprived  He just come down the heel and he was confused about events but he was awake alert talking and walking  They got in the car to drive to the [facility] and after being in the car 30 minutes seem to be coming around himself  The patient himself says that he \"woke up when he got to the [facility]\" which was an hour and 45 minutes later but family member who is with him says it was only 30 minutes of spell  All of these happen when he is sleep deprived  Finally after the workup at the hospital got back to the hotel at 3 AM went into a deep sleep and was difficult to arouse        Past medical history  TIA 2024  Hypertension  Hyperlipidemia  Factor V Leiden  BPH  Arthritis of the knee  Cluster versus migraine headaches times years more right-sided  Hearing loss    Obstructive sleep apnea      Habits  Past smoker quit   Alcohol once per month rare  Played hockey and baseball but does not recall any significant head trauma      Family history  Father heart disease  Mother may have had memory loss in her 90s  at age 100  Sister aortic aneurysm            Workup  Laboratory data 2016  Factor II gene mutation not detected  Cardiolipin antibody negative, Antithrombin III antibody negative Lupus anticoagulant negative  Beta-2 glycoprotein negative, protein C and protein S not deficient    CT scan head 1/10/2024  1. There is no evidence of acute intracranial abnormality.   2.  Mild brain volume loss.   3.  There are bilateral changes of chronic small " vessel  ischemic disease in the periventricular deep white matter.   CTA head and neck 1/10/2024  1. Normal CTA. No focal stenosis, dissection or emergent large vessel  occlusion identified.   2. Incidental note is made of medial deviation of the internal carotid arteries more pronounced on the left than the right.       This is  a normal variant.   MRI brain 1/25/2024  1.  No acute intracranial abnormality.  2.  Minimal age-related generalized parenchymal volume loss.       No particular lobar predominance.  Echo 1/25/2024, ejection fraction 55-60%, left atrium normal size  EEG 2/1/2024, 9.5-10 Hz PDR,  Normal awake and drowsy EEG,    CT head 4/26/2024  There is a well-delineated hyperattenuated mass attached to the anterosuperior portion of the third ventricle.         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   2.  No hemorrhage  3.  No acute stroke  4.  No ventriculomegaly  CT head 7/17/2024  1.  No acute intracranial process  HEAD CTA: 7/17/2024  1.  Normal CTA [facility].   NECK CTA: 7/17/2024  1.  Normal neck CTA.   MRI head  1.  No acute intracranial process  2.  Generalized atrophy and microvascular changes  3.  Redemonstrated 7 mm colloid cyst  4.  No pathologic contrast enhancement      Laboratory data review                        1/2024            4/626/24        7/2024  NA/K              139/4.5            139/4.2         138/4.0  BUN/Cr          21.4/1.01          22/1.01         25/1.0  GLU               158                   121               194-111  AST                26  WBC/HGB      8.9/16.3                                8.5/14.9  PLTs               374,000                               337,000  B12                 868  TSH                4.04  Folate             27.1  Prolactin                                    16  4-15  Ammonia                                                        34    MoCA  2/2/2024,    29 out of  "30    Exam    Review of systems  Pertinent positives negatives  Has possibly migraines or cluster headaches for years on the right side  Hearing loss and ringing in the ears  Excessive sleepiness all the time  The memory trouble as above  Hearing loss     Otherwise unremarkable      General exam  Blood pressure 144/91, pulse 53  Alert orient x 3  Lungs clear  Heart rate regular  Abdomen soft  Symmetrical pulses  No edema the feet    Neurologic exam  Alert orient x 3  Prosody speech normal  Naming normal  Comprehension normal  Repetition normal  No aphasia  No neglect    MoCA  2/2/2024,    29 out of 30    Cranial nerves II through XII  No ophthalmoplegia  No nystagmus  Visual fields intact  Face symmetrical  Tongue twisters good  Does have hearing loss even with his hearing aid in  Mild decreased blink    Upper extremities  No drift  No tremor  Normal finger-nose    Lower extremities  Distal proximal strength good    Reflexes symmetrical    Gait  Normal    Assessment/plan    1.  Memory loss/transient memory difficulty repetitive       Spell 1/25/2024 (30 minutes)       Spell 1/10/2024 (30 minutes amnesia)       Winter 2023 while staying in [place] (2 hours \"amnesia\")          Patient was in [place] and evaluated for transient memory difficulty.          Spell consists of not remembering what he is doing.        Gives limited one-word answers when family asks him questions        Symptoms last about 30 minutes at a time            Has been more sleep deprived recently    MoCA  2/2/2024,    29 out of 30    2.  Vascular risk factors        Hypertension/hyperlipidemia/TIA/factor V Leiden      3.  Severe sleep apnea       Excessive fatigue and tiredness question sleep apnea    Diagnosis  Episodic memory loss  Questionable TGA  Severe sleep apnea  Multifocal subcortical white matter changes on MRI  Episodic spells of loss of consciousness  Relative bradycardia (pulse of 53)      Patient doing better after adjustment in " his CPAP mask for the second time  Adjustment in pressures  Wearing it a lot more often    If having increased episodes in the future could reconsider whether this was epileptic  I am concerned also though that he might have bradycardia  Might need Holter monitor if he is having further spells    Talk to son and patient  Reviewed all the above  Follow-up in July 2025      Total care time today 30 minutes  The longitudinal plan of care for the diagnosis(es)/condition(s) as documented were addressed during this visit. Due to the added complexity in care, I will continue to support Prabhakar in the subsequent management and with ongoing continuity of care.          As part of the workup today  Reviewed ER notes July 2024  Reviewed CT/CTA/MRI head July 2024  Reviewed sleep specialist note July 24, 2024, second opinion sleep specialist 8/26/2024, 11/4/2024  Reviewed laboratory data                Again, thank you for allowing me to participate in the care of your patient.        Sincerely,        megha Jimenez MD

## 2024-12-16 ENCOUNTER — APPOINTMENT (OUTPATIENT)
Dept: CT IMAGING | Facility: CLINIC | Age: 74
End: 2024-12-16
Attending: EMERGENCY MEDICINE
Payer: COMMERCIAL

## 2024-12-16 ENCOUNTER — HOSPITAL ENCOUNTER (EMERGENCY)
Facility: CLINIC | Age: 74
Discharge: HOME | End: 2024-12-16
Attending: EMERGENCY MEDICINE
Payer: COMMERCIAL

## 2024-12-16 VITALS
OXYGEN SATURATION: 95 % | SYSTOLIC BLOOD PRESSURE: 152 MMHG | TEMPERATURE: 98 F | HEART RATE: 57 BPM | DIASTOLIC BLOOD PRESSURE: 99 MMHG | RESPIRATION RATE: 18 BRPM

## 2024-12-16 DIAGNOSIS — R31.9 HEMATURIA, UNSPECIFIED TYPE: ICD-10-CM

## 2024-12-16 LAB
ALBUMIN UR-MCNC: NEGATIVE MG/DL
ANION GAP SERPL CALCULATED.3IONS-SCNC: 10 MMOL/L (ref 7–15)
APPEARANCE UR: CLEAR
BILIRUB UR QL STRIP: NEGATIVE
BUN SERPL-MCNC: 21.2 MG/DL (ref 8–23)
CALCIUM SERPL-MCNC: 9 MG/DL (ref 8.8–10.4)
CHLORIDE SERPL-SCNC: 104 MMOL/L (ref 98–107)
COLOR UR AUTO: ABNORMAL
CREAT SERPL-MCNC: 0.98 MG/DL (ref 0.67–1.17)
EGFRCR SERPLBLD CKD-EPI 2021: 80 ML/MIN/1.73M2
ERYTHROCYTE [DISTWIDTH] IN BLOOD BY AUTOMATED COUNT: 14.5 % (ref 10–15)
GLUCOSE SERPL-MCNC: 89 MG/DL (ref 70–99)
GLUCOSE UR STRIP-MCNC: NEGATIVE MG/DL
HCO3 SERPL-SCNC: 25 MMOL/L (ref 22–29)
HCT VFR BLD AUTO: 43.7 % (ref 40–53)
HGB BLD-MCNC: 14.9 G/DL (ref 13.3–17.7)
HGB UR QL STRIP: NEGATIVE
KETONES UR STRIP-MCNC: NEGATIVE MG/DL
LEUKOCYTE ESTERASE UR QL STRIP: NEGATIVE
MCH RBC QN AUTO: 29.3 PG (ref 26.5–33)
MCHC RBC AUTO-ENTMCNC: 34.1 G/DL (ref 31.5–36.5)
MCV RBC AUTO: 86 FL (ref 78–100)
NITRATE UR QL: NEGATIVE
PH UR STRIP: 7.5 [PH] (ref 5–7)
PLATELET # BLD AUTO: 348 10E3/UL (ref 150–450)
POTASSIUM SERPL-SCNC: 4.3 MMOL/L (ref 3.4–5.3)
RBC # BLD AUTO: 5.08 10E6/UL (ref 4.4–5.9)
SODIUM SERPL-SCNC: 139 MMOL/L (ref 135–145)
SP GR UR STRIP: 1.01 (ref 1–1.03)
UROBILINOGEN UR STRIP-MCNC: NORMAL MG/DL
WBC # BLD AUTO: 6.6 10E3/UL (ref 4–11)

## 2024-12-16 PROCEDURE — 85041 AUTOMATED RBC COUNT: CPT | Performed by: EMERGENCY MEDICINE

## 2024-12-16 PROCEDURE — 999N000104 CT LUMBAR SPINE RECONSTRUCTED

## 2024-12-16 PROCEDURE — 81003 URINALYSIS AUTO W/O SCOPE: CPT | Performed by: EMERGENCY MEDICINE

## 2024-12-16 PROCEDURE — 85018 HEMOGLOBIN: CPT | Performed by: EMERGENCY MEDICINE

## 2024-12-16 PROCEDURE — 80048 BASIC METABOLIC PNL TOTAL CA: CPT | Performed by: EMERGENCY MEDICINE

## 2024-12-16 PROCEDURE — 99284 EMERGENCY DEPT VISIT MOD MDM: CPT | Performed by: EMERGENCY MEDICINE

## 2024-12-16 PROCEDURE — 99284 EMERGENCY DEPT VISIT MOD MDM: CPT | Mod: 25 | Performed by: EMERGENCY MEDICINE

## 2024-12-16 PROCEDURE — 74176 CT ABD & PELVIS W/O CONTRAST: CPT

## 2024-12-16 PROCEDURE — 82565 ASSAY OF CREATININE: CPT | Performed by: EMERGENCY MEDICINE

## 2024-12-16 PROCEDURE — 36415 COLL VENOUS BLD VENIPUNCTURE: CPT | Performed by: EMERGENCY MEDICINE

## 2024-12-16 RX ORDER — HYDROCODONE BITARTRATE AND ACETAMINOPHEN 5; 325 MG/1; MG/1
1 TABLET ORAL EVERY 6 HOURS PRN
Qty: 8 TABLET | Refills: 0 | Status: SHIPPED | OUTPATIENT
Start: 2024-12-16 | End: 2024-12-19

## 2024-12-16 ASSESSMENT — ACTIVITIES OF DAILY LIVING (ADL)
ADLS_ACUITY_SCORE: 41

## 2024-12-16 ASSESSMENT — COLUMBIA-SUICIDE SEVERITY RATING SCALE - C-SSRS
1. IN THE PAST MONTH, HAVE YOU WISHED YOU WERE DEAD OR WISHED YOU COULD GO TO SLEEP AND NOT WAKE UP?: NO
2. HAVE YOU ACTUALLY HAD ANY THOUGHTS OF KILLING YOURSELF IN THE PAST MONTH?: NO
6. HAVE YOU EVER DONE ANYTHING, STARTED TO DO ANYTHING, OR PREPARED TO DO ANYTHING TO END YOUR LIFE?: NO

## 2024-12-16 NOTE — ED PROVIDER NOTES
There were no vitals taken for this visit.    Prabhakar Randall is a 74-year-old male presenting with complaints of hematuria and severe low back pain.  Patient reports 1 day of hematuria followed by excruciating low back pain this morning.  Denies blood thinning medication or anticoagulants.  No history of kidney stones.  given patient's history, I recommended he/she be evaluated in the emergency department.  We discussed that he/she will likely require further testing and/or treatment beyond the capabilities of the current urgent care setting including labs and potential imaging.  Patient expresses understanding of this and agreement with the plan.  I have explained what could happen if they choose to not have the treatment/transfer.        Yvonne French PA-C  12/16/24 [id number]

## 2024-12-16 NOTE — DISCHARGE INSTRUCTIONS
As we discussed your workup here was negative for any possible cause of hematuria.  You need to follow-up with a urologist to discuss your symptoms and likely have a scope done of your bladder.  I placed a referral they will call you in the next couple of business days.  As we discussed having blood in your urine is risky for developing a clot that can obstruct her urinary flow.  If you find that you are unable to urinate when you are trying, please come back and we can help you manage this.  As always come back with changing or worsening symptoms.

## 2024-12-16 NOTE — ED TRIAGE NOTES
Patient began having blood in urine yesterday around noon. Has gotten better but now having severe pain in lower back.

## 2024-12-16 NOTE — ED PROVIDER NOTES
[facility]  Emergency Department Visit Note    PATIENT:  Prabhakar Randall     74 year old     male      [id number]    Chief complaint:  Chief Complaint   Patient presents with    Hematuria        History of present illness:  Patient is a 74 year old male with a medical history significant for BPH status post TURP multiple years ago, hypertension, history of colon cancer presenting for evaluation of blood in his urine and severe back pain.  Patient reports that yesterday he went to the bathroom and he noticed a very large amount of gross blood in his urine.  The clot was so large that it was about the size of a quarter.  He did not have any pain at this point however after the fact he had severe back pain.  He notes that this morning he woke up and then went to the bathroom again and felt maybe a little bit of hesitation with urinating and then a large clot came out.  A little bit later he developed a very severe mid back pain.  He notes that the mid back pain is better when he lies down.  He has never had pain like this before.  Otherwise he has been urinating just fine.  No recent fevers, chills, other infectious symptoms.  No dizziness or lightheadedness.  Today he went to run some errands and the pain was so severe that he decided to come to the ER and be evaluated.  He again does not have any pain while urinating but it seems to be after the fact.    Review of Systems:  As in HPI above    BP (!) 152/99   Pulse 57   Temp 98  F (36.7  C) (Oral)   Resp 18   SpO2 95%     Physical Exam  Constitutional: laying in hospital bed, alert, oriented, in no apparent distress, conversant, and answering questions appropriately  HEENT: normocephalic, atraumatic, sclerae anicteric, extraocular motions intact, and moist mucous membranes  Neck: able to fully range  Cardiovascular: regular rate and rhythm  Pulmonary: breathing comfortably on room air and lungs clear to auscultation bilaterally  Abdominal:  soft, non-tender, non-distended  Genitourinary: External genitalia, no lesions appreciated, no gross blood at the urethral meatus  Extremities/MSK: no peripheral edema  Skin: warm, dry and non-diaphoretic  Neurologic: moves all four extremities spontaneously and GCS 15  Psychiatric: calm, appropriate      MDM:  Patient is a 74 year old male with above history presenting for evaluation of hematuria and back pain.    Vitals reassuring and within normal limits. Exam is largely reassuring, no abnormalities appreciated.    Differential diagnosis includes but is not limited to UTI, nephrolithiasis, outflow obstruction, prostate cancer, bladder cancer, acute blood loss anemia.    Plan for basic labs, UA, CT abdomen to evaluate for nephrolithiasis.  Patient is in agreement with this plan.  No pain meds indicated at this time considering he is resting comfortably while lying.    Remainder of ED course below.    ED COURSE:  ED Course as of 12/16/24 1656   Mon Dec 16, 2024   1650 CT Lumbar Spine Reconstructed   1651 Abd/pelvis CT - no contrast - [facility] Protocol  IMPRESSION:   1.  Bilateral nonobstructing renal calculi.  2.  No ureteral calculus or collecting system dilatation.  3.  Prostatomegaly.  4.  CT lumbar spine is dictated separately.     1655 Patient has evidence for renal stones otherwise no abnormality seen on CT scan.  Labs are normal.  No acute blood loss anemia, stable vital signs.  Ultimately I am concerned that patient has either a prostate lesion contributing to his hematuria or a complication following his TURP.  Recommend that patient follow-up with urology.  Provided patient with a Vicodin prescription, he will follow-up with his urologist within the next week.  All questions answered and discharged in stable condition.  Patient cautioned against the possibility of a clot limiting outflow and he understands that he can come back and have this manage if this occurs.       Encounter Diagnoses:  Final diagnoses:    Hematuria, unspecified type       Final disposition: discharge    Crista Montoya DO  EM Physician  [facility]       Crista Montoya DO  12/16/24 [id number]

## 2024-12-17 ENCOUNTER — TELEPHONE (OUTPATIENT)
Dept: UROLOGY | Facility: CLINIC | Age: 74
End: 2024-12-17
Payer: COMMERCIAL

## 2024-12-17 NOTE — TELEPHONE ENCOUNTER
Spoke with wife. She states Appt with [facility] on January 22 was made prior to going to the ER.  Suggested they call them back and let them know what changed (about the ER visit with imaging) as it was [facility] who did the TURP in 2022.  Advised to give them a chance to provide the follow up he would need since he most likely will need Medication resumed as well as a Cysto.    She agreed and will cancel the appt on 1/3 if does not need anything in between now and the 22nd.    Zee MCKNIGHT   Specialty Clinic RN

## 2024-12-17 NOTE — TELEPHONE ENCOUNTER
Per Urgent care note:  Patient has evidence for renal stones otherwise no abnormality seen on CT scan.  Labs are normal.  No acute blood loss anemia, stable vital signs.  Ultimately I am concerned that patient has either a prostate lesion contributing to his hematuria or a complication following his TURP.  Recommend that patient follow-up with urology.  Provided patient with a Vicodin prescription, he will follow-up with his urologist within the next week.  All questions answered and discharged in stable condition.  Patient cautioned against the possibility of a clot limiting outflow and he understands that he can come back and have this manage if this occurs.  __________________________  No record of TURP in [facility] system.    Has appt with [facility].  Sending for review and if OK to wait or even if needs reschedule with Urologist ([facility] consideration as well) for Cysto? (Even if later date to wait for one?)    Zee MCKNIGHT   Specialty Clinic RN

## 2024-12-17 NOTE — TELEPHONE ENCOUNTER
No clot on CT, so he will definitely need a cysto to figure out what is bleeding, unfortunately we just don't have any openings. If he is taking the baby aspirin for prevention only, he should stop it. His prostate appears very large and could be the source of the bleeding. It may be helpful for him to start finasteride if he is open to it.

## 2024-12-17 NOTE — TELEPHONE ENCOUNTER
[facility] Call Center    Phone Message    May a detailed message be left on voicemail: yes - if you do not reach him at home, he asks that you try him also on his cell phone number as well. Okay to leave detailed messages.     Reason for Call: Other: Patient being referred for Gross Hematuria by referring provider.  Patient scheduled first available, but is very concerned about waiting until 1/3 to be seen with the amount of pain and blood that he has had in his urine. Sending encounter message for review and follow-up with Pt to let him know if a sooner appointment would be possible. Thank you!    Action Taken: [facility]    Travel Screening: Not Applicable     Date of Service: 1/3/2025

## 2025-03-16 ENCOUNTER — HEALTH MAINTENANCE LETTER (OUTPATIENT)
Age: 75
End: 2025-03-16

## 2025-03-25 ENCOUNTER — TELEPHONE (OUTPATIENT)
Dept: FAMILY MEDICINE | Facility: CLINIC | Age: 75
End: 2025-03-25
Payer: COMMERCIAL

## 2025-03-25 NOTE — TELEPHONE ENCOUNTER
Patient Quality Outreach    Patient is due for the following:   Hypertension -  BP check  Physical Annual Wellness Visit    Action(s) Taken:   Schedule a Annual Wellness Visit    Type of outreach:    Sent [employer] message.    Questions for provider review:    None         Meagan SUÁREZ LPN

## 2025-06-30 NOTE — PROGRESS NOTES
In person evaluation    HPI  2/2/2024, in person consultation  5/3/2024, in person visit  11/19/2024, in person visit  7/1/2025, in person visit    74-year-old being evaluated neurologically for:  Memory loss/transient memory difficulty  Severe sleep apnea    July 2025 visit:  Reviewed cardiology note 6/17/2025    MoCA  7/1/2025,    27 out of 30    Episode at the end of June 2025 lasted a little bit longer.  (6/27/2025)    He does not talk, stiffens up  Lasted a few minutes.  Was unable to move or communicate for half hour afterwards.  Had recently started new medicines that he on 6/17/2025 but I do not think this is the cause    Multiple rare episodes  2/20/2025 5/4/2025 6/27/2025 November 2024 visit review:  Patient with 2 episodes of confusion since last seen  Did get new mask on his CPAP machine and feels it is made a big difference      ED visit [facility] 7/17/2024  Patient found by wife unresponsive/drooling.  Patient slowly became more responsive and route  Was confused and had slurred speech.  When arrived in ER was able to follow commands and speech was normal.  Patient underwent multiple scans of the brain see official reports below    CT head 7/17/2024  1.  No acute intracranial process  HEAD CTA: 7/17/2024  1.  Normal CTA [facility].   NECK CTA: 7/17/2024  1.  Normal neck CTA.   MRI head  1.  No acute intracranial process  2.  Generalized atrophy and microvascular changes  3.  Redemonstrated 7 mm colloid cyst  4.  No pathologic contrast enhancement    Patient has been following with sleep specialist  Reviewed note 7/24/2024  Severe obstructive sleep apnea currently now started on CPAP.  Sleep specialist is concerned that sleep apnea does not completely explain episodic unconscious episodes    With the patient was in the ER in July having one of his episodes he was being monitored and when they turned off the lights and the TV  His O2 sats dropped to 70  And it was pretty evident that he  "stops breathing  He has more severe sleep apnea than they thought    Patient did have a second opinion for his sleep disorder  Had adjustments done and the CPAP pressure    The patient was diagnosed with LETA on 04/29/2024 (AHI=71.4).    After adjustments patient is much more alert and rested upon awakening and able to function better.  Some of this was attributed to switching to a different mask which helped a great deal    May have had a minor spell in September 2024 was a little bit confused but with the newer mask he is doing even better    He knows current events can talk no focal neurologic deficits    Conference with patient and his son that I still feel that this is from his chronic sleep apnea and it is being treated better at this time          A.  Memory loss/transient memory difficulty repetitive        Winter 2023 while staying in [place] (2 hours \"amnesia\"), this episode may have lasted as long as 12 hours kind of vague difficulty with concentratio       Spell 1/10/2024 (30 minutes amnesia), was able to drive and do tasks but was a little bit unsure of where they were at and where they were going on a trip driving [facility]       Spell 1/25/2024 (30 minutes), forgot that he had a blood draw the next day       Spell 4/26/2024 (30 minutes transient), effort he took the hunting blinds down without with others.  No actual loss of consciousness            Patient was in [place] and evaluated for transient memory difficulty.          Spell consists of not remembering what he is doing.        Gives limited one-word answers when family asks him questions        Symptoms last about 30 minutes at a time            Has been more sleep deprived recently        Significant hearing loss    MoCA  2/2/2024,    29 out of 30  7/1/2025,    27 out of 30      B.  TIA 1/11/2024    C.  Vascular risk factors        Hypertension/hyperlipidemia/TIA/factor V Leiden    D.  Severe sleep apnea        Sleep study 4/29/2024 per note " "patient at high risk for obstructive sleep apnea,        Patient's sleep study showed \"severe sleep apnea\" sleep specialist recommended in lab titration study versus home with auto CPAP titration study.        I feel that getting the sleep disorder treated and seeing if the spells become less frequent would be the first choice.        And a conference with patient and family members about the above and I feel that we should first treat the sleep disorder before labeling him with any type of seizures    Prolactin level 16 (2024)  Altered mental status change seen  ED [facility] 2024  Patient had been turkey hunting and had gotten up at 4:30 AM, spell happened at desk so he is quite sleep deprived  He just come down the heel and he was confused about events but he was awake alert talking and walking  They got in the car to drive to the [facility] and after being in the car 30 minutes seem to be coming around himself  The patient himself says that he \"woke up when he got to the [facility]\" which was an hour and 45 minutes later but family member who is with him says it was only 30 minutes of spell  All of these happen when he is sleep deprived  Finally after the workup at the hospital got back to the hotel at 3 AM went into a deep sleep and was difficult to arouse        Past medical history  TIA 2024  Hypertension  Hyperlipidemia  Factor V Leiden  BPH  Arthritis of the knee  Cluster versus migraine headaches times years more right-sided  Hearing loss    Obstructive sleep apnea      Habits  Past smoker quit   Alcohol once per month rare  Played hockey and baseball but does not recall any significant head trauma      Family history  Father heart disease  Mother may have had memory loss in her 90s  at age 100  Sister aortic aneurysm            Workup  Laboratory data 2016  Factor II gene mutation not detected  Cardiolipin antibody negative, Antithrombin III antibody negative Lupus " anticoagulant negative  Beta-2 glycoprotein negative, protein C and protein S not deficient    CT scan head 1/10/2024  1. There is no evidence of acute intracranial abnormality.   2.  Mild brain volume loss.   3.  There are bilateral changes of chronic small vessel  ischemic disease in the periventricular deep white matter.   CTA head and neck 1/10/2024  1. Normal CTA. No focal stenosis, dissection or emergent large vessel  occlusion identified.   2. Incidental note is made of medial deviation of the internal carotid arteries more pronounced on the left than the right.       This is  a normal variant.   MRI brain 1/25/2024  1.  No acute intracranial abnormality.  2.  Minimal age-related generalized parenchymal volume loss.       No particular lobar predominance.  Echo 1/25/2024, ejection fraction 55-60%, left atrium normal size  EEG 2/1/2024, 9.5-10 Hz PDR,  Normal awake and drowsy EEG,  Sleep eval April/2024 severe sleep apnea with hypoxia  CT head 4/26/2024  There is a well-delineated hyperattenuated mass attached to the anterosuperior portion of the third ventricle.         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   2.  No hemorrhage  3.  No acute stroke  4.  No ventriculomegaly  CT head 7/17/2024  1.  No acute intracranial process  HEAD CTA: 7/17/2024  1.  Normal CTA [facility].   NECK CTA: 7/17/2024  1.  Normal neck CTA.   MRI head  1.  No acute intracranial process  2.  Generalized atrophy and microvascular changes  3.  Redemonstrated 7 mm colloid cyst  4.  No pathologic contrast enhancement  MRI lumbar spine 12/16/2024  1. No acute osseous abnormality.  2. Multilevel cervical spondylosis, greatest at L4-L5       With moderate canal stenosis and moderate to severe bilateral neural foraminal narrowing.    Laboratory data review                        1/2024            4/626/24        7/2024 12/2024  NA/K              139/4.5      "       139/4.2         138/4.0        139/4.3  BUN/Cr          21.4/1.01          22/1.01         25/1.0         21.2/0.98  GLU               158                   121              194-111      89  AST                26  WBC/HGB      8.9/16.3                                8.5/14.9      6.6/14.9  PLTs               374,000                               337,000       348,000  B12                 868  TSH                4.04  Folate             27.1  Prolactin                                    16  4-15  Ammonia                                                        34    MoCA  2/2/2024,    29 out of 30  7/1/2025,    27 out of 30      Exam    Review of systems  Pertinent positives negatives  Has possibly migraines or cluster headaches for years on the right side  Hearing loss and ringing in the ears  Excessive sleepiness all the time  The memory trouble as above  Hearing loss     Otherwise unremarkable      General exam  Blood pressure 142/81, pulse 61  Alert orient x 3  Lungs clear  Heart rate regular  Abdomen soft  Symmetrical pulses  No edema the feet    Neurologic exam  Alert orient x 3  Prosody speech normal  Naming normal  Comprehension normal  Repetition normal  No aphasia  No neglect    MoCA  2/2/2024,    29 out of 30  7/1/2025,    27 out of 30    Cranial nerves II through XII  No ophthalmoplegia  No nystagmus  Visual fields intact  Face symmetrical  Tongue twisters good  Does have hearing loss even with his hearing aid in  Mild decreased blink    Upper extremities  No drift  No tremor  Normal finger-nose    Lower extremities  Distal proximal strength good    Reflexes symmetrical    Gait  Normal    Assessment/plan    1.  Memory loss/transient memory difficulty repetitive       Spell 1/25/2024 (30 minutes)       Spell 1/10/2024 (30 minutes amnesia)       Winter 2023 while staying in [place] (2 hours \"amnesia\")          Patient was in [place] and evaluated for transient memory difficulty.          Spell " consists of not remembering what he is doing.        Gives limited one-word answers when family asks him questions        Symptoms last about 30 minutes at a time            Has been more sleep deprived recently    MoCA  2/2/2024,    29 out of 30  7/1/2025,    27 out of 30      2.  Vascular risk factors        Hypertension/hyperlipidemia/TIA/factor V Leiden      3.  Severe sleep apnea       Excessive fatigue and tiredness question sleep apnea    4.  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)       More recently    Multiple rare episodes  2/20/2025 5/4/2025 6/27/2025      Diagnosis  Episodic memory loss  Questionable TGA  Severe sleep apnea  Multifocal subcortical white matter changes on MRI  Episodic spells of loss of consciousness  Relative bradycardia (pulse of 53)    Discussed empiric treatment of possible epilepsy  I do not think further EEGs will be helpful  Spells always seem to happen at night  Question whether he has increased awakenings with sleep deprivation versus decreased O2 sats versus slow pulse of 40 something at nighttime    Cardiologist did not feel that the pulse was low enough to warrant further monitoring yet    Talked about starting low-dose Keppra slowly increasing if spells are worse  Plan  Keppra 250 mg 1 p.o. nightly  If increasing episodes at night consider increasing nighttime dose slowly    He has bradycardia  He has severe sleep apnea  There is probably a combination of problems here    [place]  They will have to follow-up in April 2026  They will call to have adjustments in the Keppra though in the meantime      Discussed multiple issues as above with patient wife and son  Total care time today 41 minutes discussing memory/possible epilepsy/severe sleep apnea/bradycardia interactions and triggering the spells.  The longitudinal plan of care for the diagnosis(es)/condition(s) as documented were addressed during this visit. Due to the  added complexity in care, I will continue to support Prabhakar in the subsequent management and with ongoing continuity of care.          As part of visit today  Reviewed laboratory data  Reviewed scans as above  Reviewed cardiology note

## 2025-07-01 ENCOUNTER — OFFICE VISIT (OUTPATIENT)
Dept: NEUROLOGY | Facility: CLINIC | Age: 75
End: 2025-07-01
Payer: COMMERCIAL

## 2025-07-01 VITALS
DIASTOLIC BLOOD PRESSURE: 81 MMHG | HEART RATE: 61 BPM | BODY MASS INDEX: 31.55 KG/M2 | HEIGHT: 69 IN | SYSTOLIC BLOOD PRESSURE: 142 MMHG | WEIGHT: 213 LBS

## 2025-07-01 DIAGNOSIS — R41.3 EPISODIC MEMORY LOSS: ICD-10-CM

## 2025-07-01 DIAGNOSIS — G47.33 OSA ON CPAP: ICD-10-CM

## 2025-07-01 DIAGNOSIS — G40.209 PARTIAL SYMPTOMATIC EPILEPSY WITH COMPLEX PARTIAL SEIZURES, NOT INTRACTABLE, WITHOUT STATUS EPILEPTICUS (H): Primary | ICD-10-CM

## 2025-07-01 DIAGNOSIS — G45.4 TRANSIENT GLOBAL AMNESIA: ICD-10-CM

## 2025-07-01 DIAGNOSIS — R41.3 MEMORY LOSS: ICD-10-CM

## 2025-07-01 PROCEDURE — G2211 COMPLEX E/M VISIT ADD ON: HCPCS | Performed by: PSYCHIATRY & NEUROLOGY

## 2025-07-01 PROCEDURE — 3079F DIAST BP 80-89 MM HG: CPT | Performed by: PSYCHIATRY & NEUROLOGY

## 2025-07-01 PROCEDURE — 3077F SYST BP >= 140 MM HG: CPT | Performed by: PSYCHIATRY & NEUROLOGY

## 2025-07-01 PROCEDURE — 99215 OFFICE O/P EST HI 40 MIN: CPT | Performed by: PSYCHIATRY & NEUROLOGY

## 2025-07-01 RX ORDER — LEVETIRACETAM 250 MG/1
250 TABLET ORAL AT BEDTIME
Qty: 30 TABLET | Refills: 11 | Status: SHIPPED | OUTPATIENT
Start: 2025-07-01

## 2025-07-01 RX ORDER — EZETIMIBE 10 MG/1
10 TABLET ORAL DAILY
COMMUNITY
Start: 2025-06-17

## 2025-07-01 ASSESSMENT — MONTREAL COGNITIVE ASSESSMENT (MOCA)
WHAT IS THE TOTAL SCORE (OUT OF 30): 27
WHAT LEVEL OF EDUCATION WAS ATTAINED: 0
7. [VIGILENCE] TAP WHEN HEARING DESIGNATED LETTER: 1
9. REPEAT EACH SENTENCE: 2
4. NAME EACH OF THE THREE ANIMALS SHOWN: 3
10. [FLUENCY] NAME WORDS STARTING WITH DESIGNATED LETTER: 1
6. READ LIST OF DIGITS [FORWARD/BACKWARD]: 2
12. MEMORY INDEX SCORE: 3
11. FOR EACH PAIR OF WORDS, WHAT CATEGORY DO THEY BELONG TO (OUT OF 2): 2
8. SERIAL SUBTRACTION OF 7S: 3
13. ORIENTATION SUBSCORE: 5
VISUOSPATIAL/EXECUTIVE SUBSCORE: 5

## 2025-07-01 NOTE — NURSING NOTE
DIPESH COGNITIVE ASSESSMENT (MOCA)  Version 7.1 Original Version  VISUOSPATIAL/EXECUTIVE               COPY CUBE      [ 1   ]                                [  1  ] DRAW CLOCK (Ten past eleven)  (3 points)    [   1 ]                    [  1  ]               [  1  ]       Contour            Numbers     Hands POINTS                  5 / 5   NAMING    [  1 ]                                                                        [  1  ]                                             [    1]  Lisergio Garcia                                Camel                     3/ 3   MEMORY Read list of words, subject must repeat them. Do 2 trials, even if 1st trial is successful. Do a recall after 5 minutes  FACE VELVET [employer] BRANDON RED No Points    1st          2nd         ATTENTION Read list of digits (1 digit/sec) Subject has to repeat in the forward order       [   1 ]   2  1  8  5  4                                [  1  ] 7 4 2                          2/2   Read list of letters. The subject must tap with his hand at each letter A. No points if > 2 errors.  [ 1   ] F B A C M N A A J K L B A F A K D E A A A J A M O F A A B             1 /1   Serial 7 subtraction starting at 100          [  1  ] 93         [1    ] 86          [  1  ] 79          [ 1   ] 72         [  1  ] 65   4 or 5 correct subtractions: 3 points,  2 or 3 correct: 2 points,  1correct: 1 point,   0 correct: 0 points           3 /3   LANGUAGE Repeat: I only know that Remberto is the one to help today. [   1  ]                                      The cat always hid under the couch when dogs were in the room. [ 1  ]              2 /2   Fluency: Name maximum number of words in one minute that begin with the letter F                                                                                                                    [ 1   ] _16__ (N > 11 words)               1/1   ABSTRACTION Similarity  between e.g. banana-orange=fruit                                                                   [   1 ] train-bicycle                      [   1 ] watch-ruler             2 /2   DELAYED  RECALL Has to recall words  WITH NO CUE FACE  [   1 ] VELVET  [   1 ] Jewish  [1    ]  BRANDON  [   0 ] RED  [  0  ] Points for UNCUED recall only           3 /5           OPTIONAL Category cue           Multiple choice cue          ORIENTATION  [  1  ] Date     [   1 ] Month       [ 1   ] Year      [    0] Day      [   1 ] Place        [  1  ] City         5 /6   TOTAL  Normal > 26/30 Add 1 point if < 12 years education       27 /30

## 2025-07-01 NOTE — NURSING NOTE
Chief Complaint   Patient presents with    Transient global amnesia     8 month follow up. He did have an episode about 3 days ago, episodes are lasting longer. He doesn't talk, stiffens up, lasted a few minutes, but was unable to move or communicate for about an hour. He did start Zetia (new med) on 06/17/25, had the episode on 6/27/25. He had another episode on 2/20/25, 5/4/25 and 6/27/25     Karla De La Cruz LPN on 7/1/2025 at 9:51 AM

## 2025-07-01 NOTE — LETTER
7/1/2025      Prabhakar Randall  [address] [place] [id number]      Dear Colleague,    Thank you for referring your patient, Prabhakar Randall, to the [facility] [place]. Please see a copy of my visit note below.    In person evaluation    HPI  2/2/2024, in person consultation  5/3/2024, in person visit  11/19/2024, in person visit  7/1/2025, in person visit    74-year-old being evaluated neurologically for:  Memory loss/transient memory difficulty  Severe sleep apnea    July 2025 visit:  Reviewed cardiology note 6/17/2025    MoCA  7/1/2025,    27 out of 30    Episode at the end of June 2025 lasted a little bit longer.  (6/27/2025)    He does not talk, stiffens up  Lasted a few minutes.  Was unable to move or communicate for half hour afterwards.  Had recently started new medicines that he on 6/17/2025 but I do not think this is the cause    Multiple rare episodes  2/20/2025 5/4/2025 6/27/2025 November 2024 visit review:  Patient with 2 episodes of confusion since last seen  Did get new mask on his CPAP machine and feels it is made a big difference      ED visit [facility] 7/17/2024  Patient found by wife unresponsive/drooling.  Patient slowly became more responsive and route  Was confused and had slurred speech.  When arrived in ER was able to follow commands and speech was normal.  Patient underwent multiple scans of the brain see official reports below    CT head 7/17/2024  1.  No acute intracranial process  HEAD CTA: 7/17/2024  1.  Normal CTA [facility].   NECK CTA: 7/17/2024  1.  Normal neck CTA.   MRI head  1.  No acute intracranial process  2.  Generalized atrophy and microvascular changes  3.  Redemonstrated 7 mm colloid cyst  4.  No pathologic contrast enhancement    Patient has been following with sleep specialist  Reviewed note 7/24/2024  Severe obstructive sleep apnea currently now started on CPAP.  Sleep specialist is concerned that sleep apnea does not completely  "explain episodic unconscious episodes    With the patient was in the ER in July having one of his episodes he was being monitored and when they turned off the lights and the TV  His O2 sats dropped to 70  And it was pretty evident that he stops breathing  He has more severe sleep apnea than they thought    Patient did have a second opinion for his sleep disorder  Had adjustments done and the CPAP pressure    The patient was diagnosed with LETA on 04/29/2024 (AHI=71.4).    After adjustments patient is much more alert and rested upon awakening and able to function better.  Some of this was attributed to switching to a different mask which helped a great deal    May have had a minor spell in September 2024 was a little bit confused but with the newer mask he is doing even better    He knows current events can talk no focal neurologic deficits    Conference with patient and his son that I still feel that this is from his chronic sleep apnea and it is being treated better at this time          A.  Memory loss/transient memory difficulty repetitive        Winter 2023 while staying in [place] (2 hours \"amnesia\"), this episode may have lasted as long as 12 hours kind of vague difficulty with concentratio       Spell 1/10/2024 (30 minutes amnesia), was able to drive and do tasks but was a little bit unsure of where they were at and where they were going on a trip [place]       Spell 1/25/2024 (30 minutes), forgot that he had a blood draw the next day       Spell 4/26/2024 (30 minutes transient), effort he took the hunting blinds down without with others.  No actual loss of consciousness            Patient was in [place] and evaluated for transient memory difficulty.          Spell consists of not remembering what he is doing.        Gives limited one-word answers when family asks him questions        Symptoms last about 30 minutes at a time            Has been more sleep deprived recently        Significant hearing " "loss    MoCA  2/2/2024,    29 out of 30  7/1/2025,    27 out of 30      B.  TIA 1/11/2024    C.  Vascular risk factors        Hypertension/hyperlipidemia/TIA/factor V Leiden    D.  Severe sleep apnea        Sleep study 4/29/2024 per note patient at high risk for obstructive sleep apnea,        Patient's sleep study showed \"severe sleep apnea\" sleep specialist recommended in lab titration study versus home with auto CPAP titration study.        I feel that getting the sleep disorder treated and seeing if the spells become less frequent would be the first choice.        And a conference with patient and family members about the above and I feel that we should first treat the sleep disorder before labeling him with any type of seizures    Prolactin level 16 (4/26/2024)  Altered mental status change seen  ED [facility] 4/26/2024  Patient had been turkey hunting and had gotten up at 4:30 AM, spell happened at desk so he is quite sleep deprived  He just come down the heel and he was confused about events but he was awake alert talking and walking  They got in the car to drive to the [facility] and after being in the car 30 minutes seem to be coming around himself  The patient himself says that he \"woke up when he got to the [facility]\" which was an hour and 45 minutes later but family member who is with him says it was only 30 minutes of spell  All of these happen when he is sleep deprived  Finally after the workup at the hospital got back to the hotel at 3 AM went into a deep sleep and was difficult to arouse        Past medical history  TIA 1/11/2024  Hypertension  Hyperlipidemia  Factor V Leiden  BPH  Arthritis of the knee  Cluster versus migraine headaches times years more right-sided  Hearing loss    Obstructive sleep apnea      Habits  Past smoker quit 1984  Alcohol once per month rare  Played hockey and baseball but does not recall any significant head trauma      Family history  Father heart disease  Mother " may have had memory loss in her 90s  at age 100  Sister aortic aneurysm            Workup  Laboratory data 2016  Factor II gene mutation not detected  Cardiolipin antibody negative, Antithrombin III antibody negative Lupus anticoagulant negative  Beta-2 glycoprotein negative, protein C and protein S not deficient    CT scan head 1/10/2024  1. There is no evidence of acute intracranial abnormality.   2.  Mild brain volume loss.   3.  There are bilateral changes of chronic small vessel  ischemic disease in the periventricular deep white matter.   CTA head and neck 1/10/2024  1. Normal CTA. No focal stenosis, dissection or emergent large vessel  occlusion identified.   2. Incidental note is made of medial deviation of the internal carotid arteries more pronounced on the left than the right.       This is  a normal variant.   MRI brain 2024  1.  No acute intracranial abnormality.  2.  Minimal age-related generalized parenchymal volume loss.       No particular lobar predominance.  Echo 2024, ejection fraction 55-60%, left atrium normal size  EEG 2024, 9.5-10 Hz PDR,  Normal awake and drowsy EEG,  Sleep eval 2024 severe sleep apnea with hypoxia  CT head 2024  There is a well-delineated hyperattenuated mass attached to the anterosuperior portion of the third ventricle.         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   2.  No hemorrhage  3.  No acute stroke  4.  No ventriculomegaly  CT head 2024  1.  No acute intracranial process  HEAD CTA: 2024  1.  Normal CTA [facility].   NECK CTA: 2024  1.  Normal neck CTA.   MRI head  1.  No acute intracranial process  2.  Generalized atrophy and microvascular changes  3.  Redemonstrated 7 mm colloid cyst  4.  No pathologic contrast enhancement  MRI lumbar spine 2024  1. No acute osseous abnormality.  2. Multilevel cervical spondylosis, greatest at L4-L5        With moderate canal stenosis and moderate to severe bilateral neural foraminal narrowing.    Laboratory data review                        1/2024            4/626/24        7/2024 12/2024  NA/K              139/4.5            139/4.2         138/4.0        139/4.3  BUN/Cr          21.4/1.01          22/1.01         25/1.0         21.2/0.98  GLU               158                   121              194-111      89  AST                26  WBC/HGB      8.9/16.3                                8.5/14.9      6.6/14.9  PLTs               374,000                               337,000       348,000  B12                 868  TSH                4.04  Folate             27.1  Prolactin                                    16  4-15  Ammonia                                                        34    MoCA  2/2/2024,    29 out of 30  7/1/2025,    27 out of 30      Exam    Review of systems  Pertinent positives negatives  Has possibly migraines or cluster headaches for years on the right side  Hearing loss and ringing in the ears  Excessive sleepiness all the time  The memory trouble as above  Hearing loss     Otherwise unremarkable      General exam  Blood pressure 142/81, pulse 61  Alert orient x 3  Lungs clear  Heart rate regular  Abdomen soft  Symmetrical pulses  No edema the feet    Neurologic exam  Alert orient x 3  Prosody speech normal  Naming normal  Comprehension normal  Repetition normal  No aphasia  No neglect    MoCA  2/2/2024,    29 out of 30  7/1/2025,    27 out of 30    Cranial nerves II through XII  No ophthalmoplegia  No nystagmus  Visual fields intact  Face symmetrical  Tongue twisters good  Does have hearing loss even with his hearing aid in  Mild decreased blink    Upper extremities  No drift  No tremor  Normal finger-nose    Lower extremities  Distal proximal strength good    Reflexes symmetrical    Gait  Normal    Assessment/plan    1.  Memory loss/transient memory difficulty repetitive       Spell  "1/25/2024 (30 minutes)       Spell 1/10/2024 (30 minutes amnesia)       Winter 2023 while staying in [place] (2 hours \"amnesia\")          Patient was in [place] and evaluated for transient memory difficulty.          Spell consists of not remembering what he is doing.        Gives limited one-word answers when family asks him questions        Symptoms last about 30 minutes at a time            Has been more sleep deprived recently    MoCA  2/2/2024,    29 out of 30  7/1/2025,    27 out of 30      2.  Vascular risk factors        Hypertension/hyperlipidemia/TIA/factor V Leiden      3.  Severe sleep apnea       Excessive fatigue and tiredness question sleep apnea    4.  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)       More recently    Multiple rare episodes  2/20/2025 5/4/2025 6/27/2025      Diagnosis  Episodic memory loss  Questionable TGA  Severe sleep apnea  Multifocal subcortical white matter changes on MRI  Episodic spells of loss of consciousness  Relative bradycardia (pulse of 53)    Discussed empiric treatment of possible epilepsy  I do not think further EEGs will be helpful  Spells always seem to happen at night  Question whether he has increased awakenings with sleep deprivation versus decreased O2 sats versus slow pulse of 40 something at nighttime    Cardiologist did not feel that the pulse was low enough to warrant further monitoring yet    Talked about starting low-dose Keppra slowly increasing if spells are worse  Plan  Keppra 250 mg 1 p.o. nightly  If increasing episodes at night consider increasing nighttime dose slowly    He has bradycardia  He has severe sleep apnea  There is probably a combination of problems here    [place] leave to go south in January  They will have to follow-up in April 2026  They will call to have adjustments in the Keppra though in the meantime      Discussed multiple issues as above with patient wife and son  Total care time today 41 minutes " discussing memory/possible epilepsy/severe sleep apnea/bradycardia interactions and triggering the spells.  The longitudinal plan of care for the diagnosis(es)/condition(s) as documented were addressed during this visit. Due to the added complexity in care, I will continue to support Prabhakar in the subsequent management and with ongoing continuity of care.          As part of visit today  Reviewed laboratory data  Reviewed scans as above  Reviewed cardiology note            Again, thank you for allowing me to participate in the care of your patient.        Sincerely,        megha Jimenez MD    Electronically signed

## 2025-07-10 ENCOUNTER — TELEPHONE (OUTPATIENT)
Dept: NEUROLOGY | Facility: CLINIC | Age: 75
End: 2025-07-10
Payer: COMMERCIAL

## 2025-07-10 NOTE — TELEPHONE ENCOUNTER
[facility] Call Center    Phone Message    May a detailed message be left on voicemail: yes     Reason for Call: Pts spouse stuart would like to discuss several side effects the pt has been having since starting the medication levETIRAcetam (KEPPRA), Please call Stuart at [phone] to discuss further.     Action Taken: Message routed to:  Other: [facility]    Travel Screening: Not Applicable     Date of Service:

## 2025-07-11 ENCOUNTER — APPOINTMENT (OUTPATIENT)
Dept: CT IMAGING | Facility: CLINIC | Age: 75
End: 2025-07-11
Attending: EMERGENCY MEDICINE
Payer: COMMERCIAL

## 2025-07-11 ENCOUNTER — HOSPITAL ENCOUNTER (OUTPATIENT)
Facility: CLINIC | Age: 75
Setting detail: OBSERVATION
Discharge: HOME | End: 2025-07-12
Attending: EMERGENCY MEDICINE | Admitting: INTERNAL MEDICINE
Payer: COMMERCIAL

## 2025-07-11 DIAGNOSIS — R93.89 ABNORMAL CT SCAN: ICD-10-CM

## 2025-07-11 DIAGNOSIS — N17.9 AKI (ACUTE KIDNEY INJURY): ICD-10-CM

## 2025-07-11 DIAGNOSIS — R50.9 ACUTE FEBRILE ILLNESS: ICD-10-CM

## 2025-07-11 LAB
ALBUMIN SERPL BCG-MCNC: 4.2 G/DL (ref 3.5–5.2)
ALBUMIN UR-MCNC: 70 MG/DL
ALP SERPL-CCNC: 123 U/L (ref 40–150)
ALT SERPL W P-5'-P-CCNC: 53 U/L (ref 0–70)
ANION GAP SERPL CALCULATED.3IONS-SCNC: 13 MMOL/L (ref 7–15)
APPEARANCE UR: ABNORMAL
AST SERPL W P-5'-P-CCNC: 108 U/L (ref 0–45)
BACTERIA #/AREA URNS HPF: ABNORMAL /HPF
BASOPHILS # BLD AUTO: 0 10E3/UL (ref 0–0.2)
BASOPHILS NFR BLD AUTO: 1 %
BILIRUB SERPL-MCNC: 3.3 MG/DL
BILIRUB UR QL STRIP: NEGATIVE
BUN SERPL-MCNC: 27.5 MG/DL (ref 8–23)
CALCIUM SERPL-MCNC: 9.2 MG/DL (ref 8.8–10.4)
CHLORIDE SERPL-SCNC: 99 MMOL/L (ref 98–107)
CK SERPL-CCNC: 225 U/L (ref 39–308)
COLOR UR AUTO: YELLOW
CREAT SERPL-MCNC: 1.43 MG/DL (ref 0.67–1.17)
CRP SERPL-MCNC: 28.08 MG/L
EGFRCR SERPLBLD CKD-EPI 2021: 51 ML/MIN/1.73M2
EOSINOPHIL # BLD AUTO: 0 10E3/UL (ref 0–0.7)
EOSINOPHIL NFR BLD AUTO: 1 %
ERYTHROCYTE [DISTWIDTH] IN BLOOD BY AUTOMATED COUNT: 14.6 % (ref 10–15)
FLUAV RNA SPEC QL NAA+PROBE: NEGATIVE
FLUBV RNA RESP QL NAA+PROBE: NEGATIVE
GLUCOSE SERPL-MCNC: 100 MG/DL (ref 70–99)
GLUCOSE UR STRIP-MCNC: NEGATIVE MG/DL
HCO3 SERPL-SCNC: 23 MMOL/L (ref 22–29)
HCT VFR BLD AUTO: 48 % (ref 40–53)
HGB BLD-MCNC: 16.3 G/DL (ref 13.3–17.7)
HGB UR QL STRIP: ABNORMAL
HYALINE CASTS: 1 /LPF
IMM GRANULOCYTES # BLD: 0 10E3/UL
IMM GRANULOCYTES NFR BLD: 0 %
KETONES UR STRIP-MCNC: NEGATIVE MG/DL
LEUKOCYTE ESTERASE UR QL STRIP: NEGATIVE
LYMPHOCYTES # BLD AUTO: 0.5 10E3/UL (ref 0.8–5.3)
LYMPHOCYTES NFR BLD AUTO: 8 %
MCH RBC QN AUTO: 29.3 PG (ref 26.5–33)
MCHC RBC AUTO-ENTMCNC: 34 G/DL (ref 31.5–36.5)
MCV RBC AUTO: 86 FL (ref 78–100)
MONOCYTES # BLD AUTO: 0.3 10E3/UL (ref 0–1.3)
MONOCYTES NFR BLD AUTO: 5 %
MUCOUS THREADS #/AREA URNS LPF: PRESENT /LPF
NEUTROPHILS # BLD AUTO: 4.6 10E3/UL (ref 1.6–8.3)
NEUTROPHILS NFR BLD AUTO: 85 %
NITRATE UR QL: NEGATIVE
NRBC # BLD AUTO: 0 10E3/UL
NRBC BLD AUTO-RTO: 0 /100
PH UR STRIP: 5.5 [PH] (ref 5–7)
PLAT MORPH BLD: ABNORMAL
PLATELET # BLD AUTO: 224 10E3/UL (ref 150–450)
POTASSIUM SERPL-SCNC: 4.3 MMOL/L (ref 3.4–5.3)
PROCALCITONIN SERPL IA-MCNC: 1.16 NG/ML
PROT SERPL-MCNC: 7.2 G/DL (ref 6.4–8.3)
RBC # BLD AUTO: 5.56 10E6/UL (ref 4.4–5.9)
RBC MORPH BLD: ABNORMAL
RBC URINE: 2 /HPF
RSV RNA SPEC NAA+PROBE: NEGATIVE
SARS-COV-2 RNA RESP QL NAA+PROBE: NEGATIVE
SODIUM SERPL-SCNC: 135 MMOL/L (ref 135–145)
SP GR UR STRIP: 1.03 (ref 1–1.03)
SQUAMOUS EPITHELIAL: 2 /HPF
UROBILINOGEN UR STRIP-MCNC: NORMAL MG/DL
VARIANT LYMPHS BLD QL SMEAR: PRESENT
WBC # BLD AUTO: 5.5 10E3/UL (ref 4–11)
WBC URINE: 2 /HPF

## 2025-07-11 PROCEDURE — 82550 ASSAY OF CK (CPK): CPT | Performed by: EMERGENCY MEDICINE

## 2025-07-11 PROCEDURE — 84145 PROCALCITONIN (PCT): CPT | Performed by: EMERGENCY MEDICINE

## 2025-07-11 PROCEDURE — 250N000011 HC RX IP 250 OP 636: Performed by: HOSPITALIST

## 2025-07-11 PROCEDURE — 120N000001 HC R&B MED SURG/OB

## 2025-07-11 PROCEDURE — 36415 COLL VENOUS BLD VENIPUNCTURE: CPT | Performed by: EMERGENCY MEDICINE

## 2025-07-11 PROCEDURE — 250N000009 HC RX 250: Performed by: EMERGENCY MEDICINE

## 2025-07-11 PROCEDURE — 250N000011 HC RX IP 250 OP 636: Performed by: EMERGENCY MEDICINE

## 2025-07-11 PROCEDURE — 258N000003 HC RX IP 258 OP 636: Performed by: EMERGENCY MEDICINE

## 2025-07-11 PROCEDURE — 99285 EMERGENCY DEPT VISIT HI MDM: CPT | Performed by: EMERGENCY MEDICINE

## 2025-07-11 PROCEDURE — 81001 URINALYSIS AUTO W/SCOPE: CPT | Performed by: EMERGENCY MEDICINE

## 2025-07-11 PROCEDURE — 80053 COMPREHEN METABOLIC PANEL: CPT | Performed by: EMERGENCY MEDICINE

## 2025-07-11 PROCEDURE — 86618 LYME DISEASE ANTIBODY: CPT | Performed by: HOSPITALIST

## 2025-07-11 PROCEDURE — 86140 C-REACTIVE PROTEIN: CPT | Performed by: EMERGENCY MEDICINE

## 2025-07-11 PROCEDURE — 96374 THER/PROPH/DIAG INJ IV PUSH: CPT

## 2025-07-11 PROCEDURE — 87468 ANAPLSMA PHGCYTOPHLM AMP PRB: CPT | Performed by: HOSPITALIST

## 2025-07-11 PROCEDURE — 85004 AUTOMATED DIFF WBC COUNT: CPT | Performed by: EMERGENCY MEDICINE

## 2025-07-11 PROCEDURE — 71260 CT THORAX DX C+: CPT

## 2025-07-11 PROCEDURE — 87040 BLOOD CULTURE FOR BACTERIA: CPT | Performed by: EMERGENCY MEDICINE

## 2025-07-11 PROCEDURE — 99285 EMERGENCY DEPT VISIT HI MDM: CPT | Mod: 25

## 2025-07-11 PROCEDURE — 96372 THER/PROPH/DIAG INJ SC/IM: CPT | Mod: 59 | Performed by: HOSPITALIST

## 2025-07-11 PROCEDURE — 36415 COLL VENOUS BLD VENIPUNCTURE: CPT | Performed by: HOSPITALIST

## 2025-07-11 PROCEDURE — 87637 SARSCOV2&INF A&B&RSV AMP PRB: CPT | Performed by: EMERGENCY MEDICINE

## 2025-07-11 RX ORDER — ACETAMINOPHEN 325 MG/1
650 TABLET ORAL EVERY 4 HOURS PRN
Status: DISCONTINUED | OUTPATIENT
Start: 2025-07-11 | End: 2025-07-12 | Stop reason: HOSPADM

## 2025-07-11 RX ORDER — ATENOLOL 50 MG/1
50 TABLET ORAL DAILY
Status: DISCONTINUED | OUTPATIENT
Start: 2025-07-12 | End: 2025-07-12 | Stop reason: HOSPADM

## 2025-07-11 RX ORDER — IOPAMIDOL 755 MG/ML
104 INJECTION, SOLUTION INTRAVASCULAR ONCE
Status: COMPLETED | OUTPATIENT
Start: 2025-07-11 | End: 2025-07-11

## 2025-07-11 RX ORDER — LIDOCAINE 40 MG/G
CREAM TOPICAL
Status: DISCONTINUED | OUTPATIENT
Start: 2025-07-11 | End: 2025-07-12 | Stop reason: HOSPADM

## 2025-07-11 RX ORDER — HEPARIN SODIUM 5000 [USP'U]/.5ML
5000 INJECTION, SOLUTION INTRAVENOUS; SUBCUTANEOUS EVERY 8 HOURS
Status: DISCONTINUED | OUTPATIENT
Start: 2025-07-11 | End: 2025-07-12 | Stop reason: HOSPADM

## 2025-07-11 RX ORDER — SODIUM CHLORIDE 9 MG/ML
1000 INJECTION, SOLUTION INTRAVENOUS CONTINUOUS
Status: DISCONTINUED | OUTPATIENT
Start: 2025-07-11 | End: 2025-07-12

## 2025-07-11 RX ORDER — ONDANSETRON 2 MG/ML
4 INJECTION INTRAMUSCULAR; INTRAVENOUS EVERY 6 HOURS PRN
Status: DISCONTINUED | OUTPATIENT
Start: 2025-07-11 | End: 2025-07-12

## 2025-07-11 RX ORDER — ASPIRIN 81 MG/1
81 TABLET ORAL DAILY
Status: DISCONTINUED | OUTPATIENT
Start: 2025-07-12 | End: 2025-07-12 | Stop reason: HOSPADM

## 2025-07-11 RX ORDER — AMOXICILLIN 250 MG
1 CAPSULE ORAL 2 TIMES DAILY PRN
Status: DISCONTINUED | OUTPATIENT
Start: 2025-07-11 | End: 2025-07-12 | Stop reason: HOSPADM

## 2025-07-11 RX ORDER — ONDANSETRON 4 MG/1
4 TABLET, ORALLY DISINTEGRATING ORAL EVERY 6 HOURS PRN
Status: DISCONTINUED | OUTPATIENT
Start: 2025-07-11 | End: 2025-07-12

## 2025-07-11 RX ORDER — POLYETHYLENE GLYCOL 3350 17 G/17G
17 POWDER, FOR SOLUTION ORAL DAILY
Status: DISCONTINUED | OUTPATIENT
Start: 2025-07-12 | End: 2025-07-12 | Stop reason: HOSPADM

## 2025-07-11 RX ORDER — CALCIUM CARBONATE 500 MG/1
1000 TABLET, CHEWABLE ORAL 4 TIMES DAILY PRN
Status: DISCONTINUED | OUTPATIENT
Start: 2025-07-11 | End: 2025-07-12

## 2025-07-11 RX ORDER — LEVETIRACETAM 250 MG/1
250 TABLET ORAL AT BEDTIME
Status: DISCONTINUED | OUTPATIENT
Start: 2025-07-11 | End: 2025-07-12 | Stop reason: HOSPADM

## 2025-07-11 RX ORDER — AMOXICILLIN 250 MG
2 CAPSULE ORAL 2 TIMES DAILY PRN
Status: DISCONTINUED | OUTPATIENT
Start: 2025-07-11 | End: 2025-07-12 | Stop reason: HOSPADM

## 2025-07-11 RX ORDER — CEFTRIAXONE 2 G/1
2 INJECTION, POWDER, FOR SOLUTION INTRAMUSCULAR; INTRAVENOUS EVERY 24 HOURS
Status: DISCONTINUED | OUTPATIENT
Start: 2025-07-11 | End: 2025-07-11

## 2025-07-11 RX ADMIN — SODIUM CHLORIDE 500 ML: 0.9 INJECTION, SOLUTION INTRAVENOUS at 16:32

## 2025-07-11 RX ADMIN — SODIUM CHLORIDE 66 ML: 9 INJECTION, SOLUTION INTRAVENOUS at 15:59

## 2025-07-11 RX ADMIN — IOPAMIDOL 104 ML: 755 INJECTION, SOLUTION INTRAVENOUS at 15:59

## 2025-07-11 RX ADMIN — HEPARIN SODIUM 5000 UNITS: 10000 INJECTION, SOLUTION INTRAVENOUS; SUBCUTANEOUS at 22:25

## 2025-07-11 RX ADMIN — SODIUM CHLORIDE 1000 ML: 0.9 INJECTION, SOLUTION INTRAVENOUS at 22:26

## 2025-07-11 RX ADMIN — CEFTRIAXONE 2 G: 2 INJECTION, POWDER, FOR SOLUTION INTRAMUSCULAR; INTRAVENOUS at 16:33

## 2025-07-11 ASSESSMENT — ENCOUNTER SYMPTOMS
PSYCHIATRIC NEGATIVE: 1
GASTROINTESTINAL NEGATIVE: 1
ACTIVITY CHANGE: 1
MUSCULOSKELETAL NEGATIVE: 1
WEAKNESS: 1
APNEA: 0
EYES NEGATIVE: 1
FEVER: 1
CARDIOVASCULAR NEGATIVE: 1
APPETITE CHANGE: 1
HEMATOLOGIC/LYMPHATIC NEGATIVE: 1
ENDOCRINE NEGATIVE: 1
ALLERGIC/IMMUNOLOGIC NEGATIVE: 1

## 2025-07-11 ASSESSMENT — ACTIVITIES OF DAILY LIVING (ADL)
ADLS_ACUITY_SCORE: 52
ADLS_ACUITY_SCORE: 41
ADLS_ACUITY_SCORE: 41
ADLS_ACUITY_SCORE: 29
ADLS_ACUITY_SCORE: 41

## 2025-07-11 ASSESSMENT — COLUMBIA-SUICIDE SEVERITY RATING SCALE - C-SSRS
2. HAVE YOU ACTUALLY HAD ANY THOUGHTS OF KILLING YOURSELF IN THE PAST MONTH?: NO
6. HAVE YOU EVER DONE ANYTHING, STARTED TO DO ANYTHING, OR PREPARED TO DO ANYTHING TO END YOUR LIFE?: NO
1. IN THE PAST MONTH, HAVE YOU WISHED YOU WERE DEAD OR WISHED YOU COULD GO TO SLEEP AND NOT WAKE UP?: NO

## 2025-07-11 NOTE — ED PROVIDER NOTES
History     Chief Complaint   Patient presents with    Hypotension     HPI  Prabhakar Randall is a 74 year old male who was referred from urgent care for further comprehensive evaluation and care due to concern for intermittent hypotension, concern about acute febrile illness with temperature of 100.4 at home and concern about possible medication effect after recently starting Keppra for seizure history.    Reviewed the medical record.  Reviewed visit on 7/1/25 patient has a prior diagnosis of hypertension, history of ascending aortic dilatation, hyperlipidemia sleep apnea on CPAP and history of elevated PSA.  Patient is also prescribed 81 mg aspirin, Zetia, and 50 mg atenolol.    On examination patient arrived by car with his wife Sarika from home reporting that in the last 72 hours he has felt poorly.  He did confirm that he was recently started on ezetimibe for his cholesterol and keppr-250 mg once a day.  Patient reported poor appetite his wife confirmed that he had had recent workup for sleep-related seizure event.  During his visit on 7/1/2025 he was diagnosed with partial symptomatic epilepsy with complex partial seizures.  He reported no cough, no abdominal pain no diarrhea, no rash, no headache or neck pain and no recent fall or trauma given his new medications with feeling poorly and low blood pressure readings captured at home.  He arrived by car for further assessment and care.  Patient reports that he had a fever yesterday that defervesced with antipyretics and discussion with his spouse he has had no fever today    Allergies:  Allergies   Allergen Reactions    Oxycodone Other (See Comments)     Other reaction(s): hypotension       Problem List:    Patient Active Problem List    Diagnosis Date Noted    Altered level of consciousness 07/18/2024     Priority: Medium    LETA on CPAP 07/18/2024     Priority: Medium    Ascending aorta dilatation 04/10/2023     Priority: Medium     Formatting of this note might  be different from the original.   IMPRESSION: 6/22/23   1.  Mild aneurysmal dilatation of the ascending thoracic aorta    measuring 4.4 cm.   2.  Mild dilatation of the arch and descending thoracic aorta    measuring 3.5 cm and 3.4 cm.   3.  Mild compression deformity of T5 of indeterminate age.   4.  Benign sequela of prior granulomatous infection.      Essential hypertension 04/06/2023     Priority: Medium    Arthritis of right knee 04/06/2023     Priority: Medium    Hyperlipidemia 10/03/2019     Priority: Medium    High prostate specific antigen (PSA) 06/21/2017     Priority: Medium     R97.2 : Raised prostate specific antigen      Benign prostatic hyperplasia with urinary obstruction 10/05/2012     Priority: Medium    Benign neoplasm of colon 12/16/2008     Priority: Medium        Past Medical History:    No past medical history on file.    Past Surgical History:    No past surgical history on file.    Family History:    No family history on file.    Social History:  Marital Status:   [2]  Social History     Tobacco Use    Smoking status: Never     Passive exposure: Never    Smokeless tobacco: Never   Vaping Use    Vaping status: Never Used   Substance Use Topics    Alcohol use: Yes     Comment: once a month        Medications:    aspirin 81 MG EC tablet  atenolol (TENORMIN) 50 MG tablet  atorvastatin (LIPITOR) 40 MG tablet  ezetimibe (ZETIA) 10 MG tablet  levETIRAcetam (KEPPRA) 250 MG tablet  losartan (COZAAR) 50 MG tablet  Multiple Vitamin (MULTIVITAMIN ADULT PO)          Review of Systems   Constitutional:  Positive for activity change, appetite change and fever (100.4F at home a day prior).   HENT: Negative.     Eyes: Negative.    Respiratory:  Negative for apnea.    Cardiovascular: Negative.    Gastrointestinal: Negative.    Endocrine: Negative.    Genitourinary: Negative.    Musculoskeletal: Negative.    Skin: Negative.    Allergic/Immunologic: Negative.    Neurological:  Positive for weakness.    Hematological: Negative.    Psychiatric/Behavioral: Negative.         Physical Exam   BP: 100/66  Pulse: 57  Temp: 97.5  F (36.4  C)  Resp: 16  SpO2: 96 %      Physical Exam  Constitutional:       Appearance: He is ill-appearing.   HENT:      Head: Normocephalic and atraumatic.      Right Ear: Tympanic membrane normal.      Left Ear: Tympanic membrane normal.      Nose: Nose normal.      Mouth/Throat:      Mouth: Mucous membranes are moist.   Eyes:      Extraocular Movements: Extraocular movements intact.      Pupils: Pupils are equal, round, and reactive to light.   Cardiovascular:      Rate and Rhythm: Normal rate and regular rhythm.   Pulmonary:      Effort: Pulmonary effort is normal. No respiratory distress.      Breath sounds: Normal breath sounds. No stridor. No wheezing, rhonchi or rales.   Chest:      Chest wall: No tenderness.   Musculoskeletal:         General: No swelling, tenderness or deformity.      Cervical back: Normal range of motion and neck supple.      Right lower leg: No edema.      Left lower leg: No edema.   Skin:     Capillary Refill: Capillary refill takes less than 2 seconds.      Coloration: Skin is not jaundiced or pale.      Findings: No bruising, erythema, lesion or rash.   Neurological:      General: No focal deficit present.      Mental Status: He is alert and oriented to person, place, and time.      Cranial Nerves: No cranial nerve deficit.      Sensory: No sensory deficit.      Motor: No weakness.      Coordination: Coordination normal.      Gait: Gait normal.      Deep Tendon Reflexes: Reflexes normal.   Psychiatric:         Mood and Affect: Mood normal.         Behavior: Behavior normal.         Thought Content: Thought content normal.         Judgment: Judgment normal.         ED Course        Procedures           Critical Care time: none     None         ED medications:  Medications   sodium chloride 0.9 % infusion (has no administration in time range)   cefTRIAXone  (ROCEPHIN) 2 g vial to attach to  ml bag for ADULTS or NS 50 ml bag for PEDS (2 g Intravenous $New Bag 7/11/25 1633)   sodium chloride 0.9% BOLUS 500 mL (500 mLs Intravenous $New Bag 7/11/25 1632)   iopamidol (ISOVUE-370) solution 104 mL (104 mLs Intravenous $Given 7/11/25 1559)   sodium chloride 0.9 % bag for CT scan flush (66 mLs As instructed $Given 7/11/25 1559)   sodium chloride 0.9% BOLUS 500 mL (500 mLs Intravenous $New Bag 7/11/25 1632)     ED vitals:  Vitals:    07/11/25 1430 07/11/25 1445 07/11/25 1530 07/11/25 1700   BP: (!) 137/96      Pulse: 59      Resp:       Temp:    100.7  F (38.2  C)   TempSrc:    Oral   SpO2: 99% 99% 97%       ED labs and imaging:  Results for orders placed or performed during the hospital encounter of 07/11/25   CT Chest/Abdomen/Pelvis w Contrast     Status: None    Narrative    EXAM: CT CHEST/ABDOMEN/PELVIS W CONTRAST  LOCATION: [facility]  DATE: 7/11/2025    INDICATION: Labile blood pressures with prearrival hypotension.Fever, Advanced age. Hx of aortic dilatation and elevated PSA.  Evaluate for acute infectious etiology versus other potential process to explain fever and hypotension  COMPARISON: CT abdomen/pelvis 12/16/2024, CT angiogram chest 7/9/2024  TECHNIQUE: CT scan of the chest, abdomen, and pelvis was performed following injection of IV contrast. Multiplanar reformats were obtained. Dose reduction techniques were used.   CONTRAST: 104mL isovue 370    FINDINGS:   LUNGS AND PLEURA: No focal airspace consolidation, pleural effusion or pneumothorax. Small calcified granulomas in both lungs.    MEDIASTINUM/AXILLAE: No suspicious lymphadenopathy. Mild aneurysmal dilation of the ascending thoracic aorta, measuring up to 4.5 cm in diameter, not significantly changed from prior exam. No pericardial effusion.    CORONARY ARTERY CALCIFICATION: None.    HEPATOBILIARY: Multiple hepatic cysts, no specific follow-up recommended. No evidence of  cholecystitis or biliary obstruction.    PANCREAS: Normal.    SPLEEN: Persistent splenomegaly, measuring up to 18 cm in AP dimension, stable to slightly increased in comparison to prior exam. No focal splenic lesions are visualized.    ADRENAL GLANDS: Normal.    KIDNEYS/BLADDER: Small nonobstructing bilateral renal calculi without ureterolithiasis or hydronephrosis. Urinary bladder is partially decompressed without diffuse wall thickening or surrounding inflammation. Significant median lobe hypertrophy of   prostate with compression of the bladder base, not significantly changed over multiple prior exams. Indeterminate, mildly heterogeneous left upper pole renal lesion measuring up to 0.9 cm, stable in size since at least 2022 (series 3 image 179).    BOWEL: No obstruction or inflammatory change. Normal appendix.    LYMPH NODES: No suspicious lymphadenopathy. No abdominopelvic free fluid.    VASCULATURE: Mild calcified and noncalcified atherosclerosis. No abdominal aortic aneurysm.    PELVIC ORGANS: Prostatomegaly with significant median lobe hypertrophy and compression of the bladder base, not significantly changed from prior exam.    MUSCULOSKELETAL: No acute bony abnormality or suspicious osseous lesions.      Impression    IMPRESSION:  1.  No definite infectious source in the chest, abdomen or pelvis.  2.  Nonobstructing bilateral renal calculi without ureterolithiasis or hydronephrosis.  3.  Persistent prostatomegaly with compression of the bladder base, not significantly changed over multiple prior exams.  4.  Persistent splenomegaly.  5.  Indeterminate, slightly heterogeneous subcentimeter lesion in the upper pole the left kidney, favor benign/indolent etiology given stability since at least 2022, could consider follow-up contrast-enhanced MRI in one year if clinically indicated.  6.  Stable aneurysmal dilation of the ascending thoracic aorta, measuring up to 4.5 cm in diameter.   Comprehensive metabolic  panel     Status: Abnormal   Result Value Ref Range    Sodium 135 135 - 145 mmol/L    Potassium 4.3 3.4 - 5.3 mmol/L    Carbon Dioxide (CO2) 23 22 - 29 mmol/L    Anion Gap 13 7 - 15 mmol/L    Urea Nitrogen 27.5 (H) 8.0 - 23.0 mg/dL    Creatinine 1.43 (H) 0.67 - 1.17 mg/dL    GFR Estimate 51 (L) >60 mL/min/1.73m2    Calcium 9.2 8.8 - 10.4 mg/dL    Chloride 99 98 - 107 mmol/L    Glucose 100 (H) 70 - 99 mg/dL    Alkaline Phosphatase 123 40 - 150 U/L     (H) 0 - 45 U/L    ALT 53 0 - 70 U/L    Protein Total 7.2 6.4 - 8.3 g/dL    Albumin 4.2 3.5 - 5.2 g/dL    Bilirubin Total 3.3 (H) <=1.2 mg/dL   CRP inflammation     Status: Abnormal   Result Value Ref Range    CRP Inflammation 28.08 (H) <5.00 mg/L   Procalcitonin     Status: Abnormal   Result Value Ref Range    Procalcitonin 1.16 (H) <0.50 ng/mL   UA with Microscopic reflex to Culture     Status: Abnormal    Specimen: Urine, Midstream   Result Value Ref Range    Color Urine Yellow Colorless, Straw, Light Yellow, Yellow    Appearance Urine Slightly Cloudy (A) Clear    Glucose Urine Negative Negative mg/dL    Bilirubin Urine Negative Negative    Ketones Urine Negative Negative mg/dL    Specific Gravity Urine 1.032 1.003 - 1.035    Blood Urine Small (A) Negative    pH Urine 5.5 5.0 - 7.0    Protein Albumin Urine 70 (A) Negative mg/dL    Urobilinogen Urine Normal Normal mg/dL    Nitrite Urine Negative Negative    Leukocyte Esterase Urine Negative Negative    Bacteria Urine Few (A) None Seen /HPF    Mucus Urine Present (A) None Seen /LPF    RBC Urine 2 <=2 /HPF    WBC Urine 2 <=5 /HPF    Squamous Epithelials Urine 2 (H) <=1 /HPF    Hyaline Casts Urine 1 <=2 /LPF    Narrative    Urine Culture not indicated   CBC with platelets and differential     Status: Abnormal   Result Value Ref Range    WBC Count 5.5 4.0 - 11.0 10e3/uL    RBC Count 5.56 4.40 - 5.90 10e6/uL    Hemoglobin 16.3 13.3 - 17.7 g/dL    Hematocrit 48.0 40.0 - 53.0 %    MCV 86 78 - 100 fL    MCH 29.3 26.5 -  33.0 pg    MCHC 34.0 31.5 - 36.5 g/dL    RDW 14.6 10.0 - 15.0 %    Platelet Count 224 150 - 450 10e3/uL    % Neutrophils 85 %    % Lymphocytes 8 %    % Monocytes 5 %    % Eosinophils 1 %    % Basophils 1 %    % Immature Granulocytes 0 %    NRBCs per 100 WBC 0 <1 /100    Absolute Neutrophils 4.6 1.6 - 8.3 10e3/uL    Absolute Lymphocytes 0.5 (L) 0.8 - 5.3 10e3/uL    Absolute Monocytes 0.3 0.0 - 1.3 10e3/uL    Absolute Eosinophils 0.0 0.0 - 0.7 10e3/uL    Absolute Basophils 0.0 0.0 - 0.2 10e3/uL    Absolute Immature Granulocytes 0.0 <=0.4 10e3/uL    Absolute NRBCs 0.0 10e3/uL   RBC and Platelet Morphology     Status: Abnormal   Result Value Ref Range    RBC Morphology Confirmed RBC Indices     Platelet Assessment  Automated Count Confirmed. Platelet morphology is normal.     Automated Count Confirmed. Platelet morphology is normal.    Reactive Lymphocytes Present (A) None Seen   CK total     Status: Normal   Result Value Ref Range     39 - 308 U/L   CBC with platelets differential     Status: Abnormal    Narrative    The following orders were created for panel order CBC with platelets differential.  Procedure                               Abnormality         Status                     ---------                               -----------         ------                     CBC with platelets and ...[[id number]]  Abnormal            Final result               RBC and Platelet Morpho...[[id number]]  Abnormal            Final result                 Please view results for these tests on the individual orders.       Assessments & Plan (with Medical Decision Making)   Assessment Summary and clinical Impression: 74-year-old male who was referred from urgent care for further complaints of care due to report of injury and hypotension at home and acute febrile illness prior to presenting for care.  Tmax of 100.4F.  Recently started Keppra for seizure disorder-diagnosed by neurology with partial symptomatic biopsy with  complex partial seizures with visit 10 days earlier.  Was also started on ezetimibe.  Reported feeling poorly for last 3 days with poor appetite but no fall or trauma no diarrhea or rash fever defervesced yesterday with antibiotics but no recurrence today close contacts felt he appeared poorly and his wife was concerned about poor oral intake and potential medication side effects.  After discussion with care team he was encouraged to come in to be assessed.  Patient appeared ill but was nontoxic.  His blood pressure was somewhat labile and his workup was broadened to ensure his symptoms of hypotension at home with feeling poorly with last 3 days was not related to an infectious etiology or more serious organic process his workup revealed elevated inflammatory markers including CRP and procalcitonin within normal hemogram.   Advanced imaging of the chest abdomen pelvis with contrast revealed no definite findings to explain patient's febrile illness and prearrival hypotension with lethargy.  Patient is admitted to medicine with plan for further workup.  With concern for fever without obvious source with pending testing including broadening of testing directed by the admitting care team with pending cultures on empiric anti-inflectives and acute kidney injury.    ED course and plan:  Reviewed the medical record.  Reviewed visit on 7/1/25 given intermittent low blood pressure readings at home with relative hypotension on arrival and acute febrile illness at home advised imaging chest abdomen pelvis with contrast to assess for potential source for infection.  His workup was broadened to include urinalysis. Urinalysis revealed slightly cloudy urine, microscopic hematuria but no convincing evidence for infection-elevated CRP-28 and procalcitonin-1.16.   Given his elevated renal function with concern for PERLA and CKD he was started on IV fluids  Patient is started empirically on IV anti-infectives after blood cultures were  obtained due to concern for bacteremia given prearrival hypotension and elevated inflammatory markers including procalcitonin prior to completion of advanced imaging with contrast.  Patient's blood pressure seem to normalize to his baseline at home during his ED course of care with therapies offered.  Normal CK.    Fever returned during ED course of care was notified by the ED charge RN at 5:25 PM the patient had a temp of 100.7F.  He was offered antipyretics    Advanced imaging chest abdomen pelvis with contrast revealing persistent prostatomegaly with compression of the bladder base, not significantly changed over multiple prior exams. Persistent splenomegaly. Nonobstructing bilateral renal calculi without ureterolithiasis or hydronephrosis. Indeterminate, slightly heterogeneous subcentimeter lesion in the upper pole the left kidney, favor benign/indolent etiology given stability since at least 2022, could consider follow-up contrast-enhanced MRI in one year if clinically indicated. Stable aneurysmal dilation of the ascending thoracic aorta, measuring up to 4.5 cm in diameter.  Additional details outlined in medical record.    With patient's intermittent fevers though no persistent hypotension I reviewed with the admitting provider the role of admission locally to monitor for blood cultures results versus transfer to higher level of care including the possibility of comprehensive echocardiogram to assess for vegetations.  Given availability of echocardi[facility]-over the weekend at [facility].    Spoke with AUGUSTINA Singh PA-C admitting provider at 6.05pm who  accepted patient for further care with acute febrile illness of unclear cause with lethargy and elevated inflammatory markers. We agreed to viral respiratory panel (Flu, COVID, RSV)  with plan to admit for fever of uncertain cause or source.  Admittig care team to include tickborne related illness and extended viral respiratory panel test as necessary.    Anticipated  plan of care was reviewed with patient and spouse including rationale for hospitalization.  At shift end patient was awaiting transfer to medical floor for ongoing care. Updated the overnight provider in the event of a clinical status change while awaiting transfer.  Patient did report taking her Plavix this morning but did not take her aspirin and this was offered.        Disclaimer: This note consists of symbols derived from keyboarding, dictation and/or voice recognition software. As a result, there may be errors in the script that have gone undetected. Please consider this when interpreting information found in this chart.    I have reviewed the nursing notes.    I have reviewed the findings, diagnosis, plan and need for follow up with the patient.           Medical Decision Making  The patient's presentation was of high complexity (intermittent hypotension, acute febrile illness at home, history of elevated PSA, history of benign neoplasm of the colon, BPH and hypertension, history of obstructive sleep apnea and sleep).    The patient's evaluation involved:  ordering and/or review of 3+ test(s) in this encounter (blood work, urinalysis, frequent vital signs, advanced imaging chest abdomen pelvis)    The patient's management necessitated high risk (admit for further inpatient care with concern for acute febrile illness of uncertain cause with pending culture).        New Prescriptions    No medications on file       Final diagnoses:   PERLA (acute kidney injury)   Acute febrile illness - in 24hrs. Tmax- 100.7F during ED course of care.   Abnormal CT scan - IMPRESSION: 1.  No definite infectious source in the chest, abdomen or pelvis. 2.  Nonobstructing bilateral renal calculi without ureterolithiasis or hydronephrosis. 3.  Persistent prostatomegaly with compression of the bladder base, not significantly changed over multiple prior exams. 4.  Persistent splenomegaly. 5.  Indeterminate, slightly heterogeneous  subcentimeter lesion in the upper pole the left kidney, favor benign/indolent etiology given stability since at least 2022, could consider follow-up contrast-enhanced MRI in one year if clinically indicated. 6.  Stable aneurysmal dilation of the ascending thoracic aorta, measuring up to 4.5 cm in diameter.       7/11/2025   [facility] EMERGENCY DEPT       Manoj Stacy MD  07/11/25 [id number]

## 2025-07-11 NOTE — TELEPHONE ENCOUNTER
"Returned call to Sarika (ctc on file). Recommended Urgent Care for eval due to symptoms Prabhakar is experiencing. Sarika reports fever on and off, very fatigued, chills, clammy, \"lower\" BP. Prabhakar started Keppra 7/3, concerned about possible reaction or infection.     Please watch for Urgent Care note and advise if any initial recommendations.    Maribell MAST RN  [facility] Neurology    "

## 2025-07-11 NOTE — TELEPHONE ENCOUNTER
Agree with patient's report of fever fatigue clammy low blood pressure he should go to urgent care or the ER workup for infection  I do not think that this is necessarily from starting the Keppra in early July.  megha Jimenez MD on 7/11/2025 at 11:21 AM

## 2025-07-11 NOTE — TELEPHONE ENCOUNTER
Patient currently in emergency department in [place], [place]. Will check in with patient after ED work-up is complete.     Verónica JONES RN, BSN  [facility] Neurology

## 2025-07-11 NOTE — TELEPHONE ENCOUNTER
[facility] Call Center    Phone Message    May a detailed message be left on voicemail: yes     Reason for Call: Other: Patient spouse called in again to speak with a RN regarding patients symptoms, and possible medication reaction. Spouse stated that patient's BP has been up and down, but mostly too low. Spouse also noted patient has been having a fever on and off as well as very low energy. Sarika reports that his symptoms started Wednesday 7/9 and patient started medication on 7/3. Please call when available.      Action Taken: Message routed to:  Other: Neurology     Travel Screening: Not Applicable     Date of Service:

## 2025-07-11 NOTE — ED TRIAGE NOTES
"Patient has had low bp and elevated temp for past few days. Reports feeling lethargic and being more \"red\" than usual.         "

## 2025-07-11 NOTE — TELEPHONE ENCOUNTER
[facility] Call Center    Phone Message    May a detailed message be left on voicemail: yes     Reason for Call: Patients wife Sarika (ctc on file) calling in requesting for an update. States that since taking the medication, patient has been having fever and chills. Gila Baum can be reached at [phone].    Action Taken: Message routed to:  Other: [facility] Neu[facility]

## 2025-07-11 NOTE — ED PROVIDER NOTES
/66   Pulse 57   Temp 97.5  F (36.4  C) (Oral)   Resp 16   SpO2 96%     Prabhakar Randall is a 74-year-old male presenting with complaints of fatigue, fevers and intermittent hypotension.  Patient reports he did start Keppra at the beginning of the month and is not sure if this is related.  He does report fever of 100.4 F yesterday.  He also feels body aches, chills generalized fatigue.  He is concerned for his blood pressure as it is typically in the 130s over 80s however it has consistently been around 100 over 60s.  Given patient's history, I recommended he/she be evaluated in the emergency department.  We discussed that he/she will likely require further testing and/or treatment beyond the capabilities of the current urgent care setting including labs and potential imaging.  Patient expresses understanding of this and agreement with the plan.  I have explained what could happen if they choose to not have the treatment/transfer.         Yvonne French PA-C  07/11/25 [id number]

## 2025-07-12 VITALS
OXYGEN SATURATION: 97 % | TEMPERATURE: 97.5 F | DIASTOLIC BLOOD PRESSURE: 75 MMHG | HEART RATE: 63 BPM | RESPIRATION RATE: 18 BRPM | BODY MASS INDEX: 31.48 KG/M2 | HEIGHT: 69 IN | SYSTOLIC BLOOD PRESSURE: 127 MMHG | WEIGHT: 212.52 LBS

## 2025-07-12 LAB
A PHAGOCYTOPH DNA BLD QL NAA+PROBE: NOT DETECTED
ALBUMIN SERPL BCG-MCNC: 3.5 G/DL (ref 3.5–5.2)
ALP SERPL-CCNC: 102 U/L (ref 40–150)
ALT SERPL W P-5'-P-CCNC: 46 U/L (ref 0–70)
ANION GAP SERPL CALCULATED.3IONS-SCNC: 11 MMOL/L (ref 7–15)
AST SERPL W P-5'-P-CCNC: 90 U/L (ref 0–45)
BABESIA DNA BLD QL NAA+PROBE: NOT DETECTED
BILIRUB SERPL-MCNC: 2.1 MG/DL
BUN SERPL-MCNC: 22.1 MG/DL (ref 8–23)
CALCIUM SERPL-MCNC: 8.1 MG/DL (ref 8.8–10.4)
CHLORIDE SERPL-SCNC: 101 MMOL/L (ref 98–107)
CK SERPL-CCNC: 209 U/L (ref 39–308)
CREAT SERPL-MCNC: 1.24 MG/DL (ref 0.67–1.17)
CRP SERPL-MCNC: 32.92 MG/L
EGFRCR SERPLBLD CKD-EPI 2021: 60 ML/MIN/1.73M2
EHRLICHIA DNA SPEC QL NAA+PROBE: NOT DETECTED
ERYTHROCYTE [DISTWIDTH] IN BLOOD BY AUTOMATED COUNT: 14.4 % (ref 10–15)
GLUCOSE SERPL-MCNC: 88 MG/DL (ref 70–99)
HCO3 SERPL-SCNC: 21 MMOL/L (ref 22–29)
HCT VFR BLD AUTO: 41.1 % (ref 40–53)
HGB BLD-MCNC: 13.7 G/DL (ref 13.3–17.7)
MCH RBC QN AUTO: 28.7 PG (ref 26.5–33)
MCHC RBC AUTO-ENTMCNC: 33.3 G/DL (ref 31.5–36.5)
MCV RBC AUTO: 86 FL (ref 78–100)
PLATELET # BLD AUTO: 179 10E3/UL (ref 150–450)
POTASSIUM SERPL-SCNC: 4 MMOL/L (ref 3.4–5.3)
PROT SERPL-MCNC: 5.5 G/DL (ref 6.4–8.3)
RBC # BLD AUTO: 4.78 10E6/UL (ref 4.4–5.9)
SODIUM SERPL-SCNC: 133 MMOL/L (ref 135–145)
WBC # BLD AUTO: 3.6 10E3/UL (ref 4–11)

## 2025-07-12 PROCEDURE — G0378 HOSPITAL OBSERVATION PER HR: HCPCS

## 2025-07-12 PROCEDURE — 82550 ASSAY OF CK (CPK): CPT | Performed by: HOSPITALIST

## 2025-07-12 PROCEDURE — 85027 COMPLETE CBC AUTOMATED: CPT | Performed by: HOSPITALIST

## 2025-07-12 PROCEDURE — 250N000011 HC RX IP 250 OP 636: Performed by: HOSPITALIST

## 2025-07-12 PROCEDURE — 96372 THER/PROPH/DIAG INJ SC/IM: CPT | Performed by: HOSPITALIST

## 2025-07-12 PROCEDURE — 258N000003 HC RX IP 258 OP 636: Performed by: EMERGENCY MEDICINE

## 2025-07-12 PROCEDURE — 84155 ASSAY OF PROTEIN SERUM: CPT | Performed by: HOSPITALIST

## 2025-07-12 PROCEDURE — 86140 C-REACTIVE PROTEIN: CPT | Performed by: HOSPITALIST

## 2025-07-12 PROCEDURE — 250N000013 HC RX MED GY IP 250 OP 250 PS 637: Performed by: HOSPITALIST

## 2025-07-12 PROCEDURE — 36415 COLL VENOUS BLD VENIPUNCTURE: CPT | Performed by: HOSPITALIST

## 2025-07-12 RX ORDER — ONDANSETRON 2 MG/ML
4 INJECTION INTRAMUSCULAR; INTRAVENOUS EVERY 6 HOURS PRN
Status: DISCONTINUED | OUTPATIENT
Start: 2025-07-12 | End: 2025-07-12 | Stop reason: HOSPADM

## 2025-07-12 RX ORDER — CALCIUM CARBONATE 500 MG/1
1000 TABLET, CHEWABLE ORAL 4 TIMES DAILY PRN
Status: DISCONTINUED | OUTPATIENT
Start: 2025-07-12 | End: 2025-07-12 | Stop reason: HOSPADM

## 2025-07-12 RX ORDER — DOXYCYCLINE 100 MG/1
100 CAPSULE ORAL 2 TIMES DAILY
Qty: 26 CAPSULE | Refills: 0 | Status: SHIPPED | OUTPATIENT
Start: 2025-07-12

## 2025-07-12 RX ORDER — ONDANSETRON 4 MG/1
4 TABLET, ORALLY DISINTEGRATING ORAL EVERY 6 HOURS PRN
Status: DISCONTINUED | OUTPATIENT
Start: 2025-07-12 | End: 2025-07-12 | Stop reason: HOSPADM

## 2025-07-12 RX ADMIN — ATENOLOL 50 MG: 50 TABLET ORAL at 09:32

## 2025-07-12 RX ADMIN — ASPIRIN 81 MG: 81 TABLET, COATED ORAL at 09:32

## 2025-07-12 RX ADMIN — HEPARIN SODIUM 5000 UNITS: 10000 INJECTION, SOLUTION INTRAVENOUS; SUBCUTANEOUS at 06:40

## 2025-07-12 RX ADMIN — ACETAMINOPHEN 650 MG: 325 TABLET ORAL at 00:14

## 2025-07-12 RX ADMIN — SODIUM CHLORIDE 1000 ML: 0.9 INJECTION, SOLUTION INTRAVENOUS at 06:41

## 2025-07-12 ASSESSMENT — ACTIVITIES OF DAILY LIVING (ADL)
ADLS_ACUITY_SCORE: 31
ADLS_ACUITY_SCORE: 29
ADLS_ACUITY_SCORE: 31
ADLS_ACUITY_SCORE: 29

## 2025-07-12 NOTE — PLAN OF CARE
"Goal Outcome Evaluation:      Plan of Care Reviewed With: patient    Overall Patient Progress: improvingOverall Patient Progress: improving    Outcome Evaluation: Pt A&O x4. Up independently. No pain/discomfort voiced from patient. Febrile x1 with 100.7 - PRN tylenol given & effective - temperature 97.7. Home CPAP set up by RT. Patient [place] & has bilateral hearing aides.      Activity:  Independent    Neuro: A&O x4       Resp: Home CPAP use. No complaints of SOB or chest pain.     GI/: Continent.     Skin: Denied skin check assessment - patient stated \"I'm all clean & put together\"    Diet: Regular diet    IV Access/Drains: Left upper arm - NS infusing at 125mL/hr.     Vitals: /76 (BP Location: Right arm, Patient Position: Semi-Lucero's, Cuff Size: Adult Regular)   Pulse 67   Temp 97.7  F (36.5  C) (Oral)   Resp 18   Ht 1.753 m (5' 9\")   Wt 96.4 kg (212 lb 8.4 oz)   SpO2 96%   BMI 31.38 kg/m      Pain:  None reported from patient     Plan: Blood cultures pending        "

## 2025-07-12 NOTE — PLAN OF CARE
"[place] [facility] [place]    Patient admitted to room 2215 at approximately 2120 via cart from emergency room. Patient was accompanied by spouse and transport tech.     Verbal SBAR report received from KRISTINE Reynoso prior to patient arrival.     Patient ambulated to bed independently. Patient alert and oriented X 3. The patient is not having any pain.  . Admission vital signs: Blood pressure 133/76, pulse 71, temperature 98.1  F (36.7  C), temperature source Oral, resp. rate 20, height 1.753 m (5' 9\"), weight 96.4 kg (212 lb 8.4 oz), SpO2 95%. Patient was oriented to plan of care, call light, bed controls, tv, telephone, bathroom, and visiting hours.     Risk Assessment    The following safety risks were identified during admission: none. Yellow risk band applied: NO.     Skin Initial Assessment    This writer admitted this patient and completed a full skin assessment and Walter score in the Adult PCS flowsheet.   Photo documentation of skin problem and/or wound competed via CDEL application (located under Media):  N/A    Appropriate interventions initiated as needed.     Secondary skin check refused by patient         Education    Patient has a [place] to Observation order: No  Observation education completed and documented: N/A      Gisella Damon RN                                "

## 2025-07-12 NOTE — PROGRESS NOTES
[facility] Medicine Progress Note  Date of Service: 07/12/2025    Assessment & Plan      Prabhakar Randall is a 74 year old male admitted on 7/11/2025. Presented with fever and chills, generalized weakness    Acute febrile illness  Suspect viral etiology  Generalized weakness  - Influenza/COVID negative  Temperature 100.4  - CRP 28.08, Pro-Placido 1.16, WBC 5.5, UA with few bacteria and mucus squamous epi present  - check tick borne illness set  - CT chest abdomen pelvis with persistent splenomegaly, no lymphadenopathy, no acute process  -Status post Rocephin 2 g IV x 1 in emergency department  - No clear SIRS or sepsis, no meningeal signs thus LP not indicated, will hold off on further antibiotics given no clear bacterial source  -Unclear etiology at this point  - CRP, CK pending further work up for muscle etiology if appropriate   -Has already received antibiotics, if further concern for infectious etiology obtain blood cultures  - Cannot rule out Keppra as the etiology, discussed with neurology in a.m.    Acute kidney injury  - Creatinine 1.43 from baseline 0.98  - Status post 1 L fluids in emergency department, continue IV fluids and serial renal function monitoring    Elevated bilirubin  - Bilirubin 3.3 from previous 1.6  - Benign abdominal exam, serial monitoring and if evidence of abdominal discomfort or escalating bilirubin despite fluid resuscitation consider MRCP    Elevated aminotransferases  -     Microscopic hematuria  - Follow-up outpatient for chronicity      Chronic medical conditions  Partial symptomatic epilepsy with complex partial seizures, not intractable, without status epilepticus recently started on Keppra 250 mg nightly dosed by neurology hold and reassess dose in a.m. given acute kidney injury    HTN on atenolol 50 mg, aspirin 81 mg, atorvastatin 40 mg will hold following aminotransferase, ezetimibe 10 g hold as recently initiated, hold losartan given  "renal function  HLD  Ascending thoracic aorta dilated at 4.5 cm and stable on CT  LETA on CPAP  Nephrolithiasis, nonobstructing on CT  Renal lesion stable since 2022 on CT  BPH         Instructions for use   aspirin 81 MG EC tablet Take 81 mg by mouth daily   atenolol (TENORMIN) 50 MG tablet Take 1 tablet (50 mg) by mouth daily   atorvastatin (LIPITOR) 40 MG tablet Take 1 tablet (40 mg) by mouth daily   ezetimibe (ZETIA) 10 MG tablet Take 10 mg by mouth daily.   levETIRAcetam (KEPPRA) 250 MG tablet Take 1 tablet (250 mg) by mouth at bedtime.   losartan (COZAAR) 50 MG tablet Take 1 tablet (50 mg) by mouth daily   Multiple Vitamin (MULTIVITAMIN ADULT PO)      Clinically Significant Risk Factors Present on Admission   { TIP  This section helps capture the illness of the patient on admission.     - Review diagnoses highlighted in blue; right click, edit & delete if not appropriate   - If blank, no additional diagnoses identified   :58356}      # Hyponatremia: Lowest Na = 133 mmol/L in last 2 days, will monitor as appropriate   # Hypocalcemia: Lowest Ca = 8.1 mg/dL in last 2 days, will monitor and replace as appropriate       # Drug Induced Platelet Defect: home medication list includes an antiplatelet medication   # Hypertension: Noted on problem list           # Obesity: Estimated body mass index is 31.38 kg/m  as calculated from the following:    Height as of this encounter: 1.753 m (5' 9\").    Weight as of this encounter: 96.4 kg (212 lb 8.4 oz).              Diet: Advance Diet as Tolerated: Regular Diet Adult    DVT Prophylaxis: {DVT PROPHYLAXIS:773751}  Frost Catheter: Not present  Lines: None     Cardiac Monitoring: None  Code Status: Full Code      ? ***  Discussion/Disposition: ***  {TIP  The patient's Medical Readiness for Discharge [MRD] has been documented today or is 'Ready Now'. Last Documentation- Anticipated in 2-4 Days   Use SmartList below if a change is needed.  :90351}  Medically Ready for Discharge: " "{TIME; MEDICALLY READY FOR DISCHARGE:75478941}         Medical Decision Making   { TIP   MDM Calculator    MDM grid (w/ times)    Coding Support Chat  Billing is now based on time OR medical decision making complexity. Medical decision making included in your A&P does NOT need to be re-documented here.    :69340}    {Time  :076136::\"*** MINUTES SPENT BY ME on the date of service doing chart review, history, exam, documentation & further activities per the note.\"}        Carlton Philip MD  [facility]  Securely message with Only-apartments (more info)  Text page via [facility] Paging/Directory     Interval History   ***    Physical Exam   Temp:  [97.5  F (36.4  C)-100.7  F (38.2  C)] 97.5  F (36.4  C)  Pulse:  [56-71] 63  Resp:  [16-20] 18  BP: ()/(58-96) 127/75  SpO2:  [92 %-99 %] 97 %    Weights:   Vitals:    07/11/25 2122   Weight: 96.4 kg (212 lb 8.4 oz)    Body mass index is 31.38 kg/m .    Constitutional: ***  CV: Regular, ***  Respiratory: CTA bilaterally  GI: Soft, non-tender, bowel sounds ***  Skin: Warm and dry  Musculoskeletal: ***    Data   Recent Labs   Lab 07/12/25  0516 07/11/25  1448   WBC 3.6* 5.5   HGB 13.7 16.3   MCV 86 86    224   * 135   POTASSIUM 4.0 4.3   CHLORIDE 101 99   CO2 21* 23   BUN 22.1 27.5*   CR 1.24* 1.43*   ANIONGAP 11 13   TYREL 8.1* 9.2   GLC 88 100*   ALBUMIN 3.5 4.2   PROTTOTAL 5.5* 7.2   BILITOTAL 2.1* 3.3*   ALKPHOS 102 123   ALT 46 53   AST 90* 108*       Recent Labs   Lab 07/12/25  0516 07/11/25  1448   GLC 88 100*        Unresulted Labs Ordered in the Past 30 Days of this Admission       Date and Time Order Name Status Description    7/11/2025  9:51 PM Lyme Disease Total Antibodies with Reflex to Confirmation In process     7/11/2025  9:51 PM Tick-Borne Disease Panel (Non-Lyme) by PCR In process     7/11/2025  3:42 PM Blood Culture Peripheral blood (BC) Arm, Left Preliminary     7/11/2025  3:42 PM Blood Culture Peripheral " blood (BC) Hand, Right Preliminary              Imaging:   Recent Results (from the past 24 hours)   CT Chest/Abdomen/Pelvis w Contrast    Narrative    EXAM: CT CHEST/ABDOMEN/PELVIS W CONTRAST  LOCATION: [facility]  DATE: 7/11/2025    INDICATION: Labile blood pressures with prearrival hypotension.Fever, Advanced age. Hx of aortic dilatation and elevated PSA.  Evaluate for acute infectious etiology versus other potential process to explain fever and hypotension  COMPARISON: CT abdomen/pelvis 12/16/2024, CT angiogram chest 7/9/2024  TECHNIQUE: CT scan of the chest, abdomen, and pelvis was performed following injection of IV contrast. Multiplanar reformats were obtained. Dose reduction techniques were used.   CONTRAST: 104mL isovue 370    FINDINGS:   LUNGS AND PLEURA: No focal airspace consolidation, pleural effusion or pneumothorax. Small calcified granulomas in both lungs.    MEDIASTINUM/AXILLAE: No suspicious lymphadenopathy. Mild aneurysmal dilation of the ascending thoracic aorta, measuring up to 4.5 cm in diameter, not significantly changed from prior exam. No pericardial effusion.    CORONARY ARTERY CALCIFICATION: None.    HEPATOBILIARY: Multiple hepatic cysts, no specific follow-up recommended. No evidence of cholecystitis or biliary obstruction.    PANCREAS: Normal.    SPLEEN: Persistent splenomegaly, measuring up to 18 cm in AP dimension, stable to slightly increased in comparison to prior exam. No focal splenic lesions are visualized.    ADRENAL GLANDS: Normal.    KIDNEYS/BLADDER: Small nonobstructing bilateral renal calculi without ureterolithiasis or hydronephrosis. Urinary bladder is partially decompressed without diffuse wall thickening or surrounding inflammation. Significant median lobe hypertrophy of   prostate with compression of the bladder base, not significantly changed over multiple prior exams. Indeterminate, mildly heterogeneous left upper pole renal lesion measuring  "up to 0.9 cm, stable in size since at least 2022 (series 3 image 179).    BOWEL: No obstruction or inflammatory change. Normal appendix.    LYMPH NODES: No suspicious lymphadenopathy. No abdominopelvic free fluid.    VASCULATURE: Mild calcified and noncalcified atherosclerosis. No abdominal aortic aneurysm.    PELVIC ORGANS: Prostatomegaly with significant median lobe hypertrophy and compression of the bladder base, not significantly changed from prior exam.    MUSCULOSKELETAL: No acute bony abnormality or suspicious osseous lesions.      Impression    IMPRESSION:  1.  No definite infectious source in the chest, abdomen or pelvis.  2.  Nonobstructing bilateral renal calculi without ureterolithiasis or hydronephrosis.  3.  Persistent prostatomegaly with compression of the bladder base, not significantly changed over multiple prior exams.  4.  Persistent splenomegaly.  5.  Indeterminate, slightly heterogeneous subcentimeter lesion in the upper pole the left kidney, favor benign/indolent etiology given stability since at least 2022, could consider follow-up contrast-enhanced MRI in one year if clinically indicated.  6.  Stable aneurysmal dilation of the ascending thoracic aorta, measuring up to 4.5 cm in diameter.        I reviewed all new labs and imaging results over the last 24 hours. I personally reviewed {Choose images/EKG's you personally looked at today:358154::\"no images or EKG's today\"}.    Medications   Current Facility-Administered Medications   Medication Dose Route Frequency Provider Last Rate Last Admin    sodium chloride 0.9 % infusion  1,000 mL Intravenous Continuous Manoj Stacy  mL/hr at 07/12/25 0641 1,000 mL at 07/12/25 0641     Current Facility-Administered Medications   Medication Dose Route Frequency Provider Last Rate Last Admin    aspirin EC tablet 81 mg  81 mg Oral Daily Maribell Joseph MD   81 mg at 07/12/25 0932    atenolol (TENORMIN) tablet 50 mg  50 mg Oral Daily Maribell Joseph, " MD   50 mg at 07/12/25 0932    heparin ANTICOAGULANT injection 5,000 Units  5,000 Units Subcutaneous Q8H Maribell Joseph MD   5,000 Units at 07/12/25 0640    [Held by provider] levETIRAcetam (KEPPRA) tablet 250 mg  250 mg Oral At Bedtime Maribell Joseph MD        polyethylene glycol (MIRALAX) Packet 17 g  17 g Oral Daily Maribell Joseph MD        sodium chloride (PF) 0.9% PF flush 3 mL  3 mL Intracatheter Q8H [facility] Maribell Joseph MD   3 mL at 07/11/25 5215       Carlton Philip MD  [facility] Medicine

## 2025-07-12 NOTE — PLAN OF CARE
"Problem: Adult Inpatient Plan of Care  Goal: Plan of Care Review  Description: The Plan of Care Review/Shift note should be completed every shift.  The Outcome Evaluation is a brief statement about your assessment that the patient is improving, declining, or no change.  This information will be displayed automatically on your shift  note.  Outcome: Progressing  Goal: Patient-Specific Goal (Individualized)  Description: You can add care plan individualizations to a care plan. Examples of Individualization might be:  \"Parent requests to be called daily at 9am for status\", \"I have a hard time hearing out of my right ear\", or \"Do not touch me to wake me up as it startles  me\".  Outcome: Progressing  Goal: Absence of Hospital-Acquired Illness or Injury  Outcome: Progressing  Intervention: Identify and Manage Fall Risk  Recent Flowsheet Documentation  Taken 7/12/2025 0938 by Hortencia Matson RN  Safety Promotion/Fall Prevention: nonskid shoes/slippers when out of bed  Intervention: Prevent Skin Injury  Recent Flowsheet Documentation  Taken 7/12/2025 0938 by Hortencia Matson RN  Body Position: position changed independently  Goal: Optimal Comfort and Wellbeing  Outcome: Progressing  Goal: Readiness for Transition of Care  Outcome: Progressing     Problem: Delirium  Goal: Optimal Coping  Outcome: Progressing  Goal: Improved Behavioral Control  Outcome: Progressing  Goal: Improved Attention and Thought Clarity  Outcome: Progressing  Goal: Improved Sleep  Outcome: Progressing     Problem: Fever  Goal: Body Temperature in Desired Range  Outcome: Progressing     Goal Outcome Evaluation:  Patient is alert and oriented.  States he is feeling better this morning, temperature was 100.7 at midnight and 97.5 this morning.  Reports appetite has been decreased but he was able to eat 75% of breakfast.  Denies pain.  Has been ambulating in his room independently, voiding and reports he had a bowel movement this morning.  Fluids " infusing 125/hr.  /75 (BP Location: Right arm)   Pulse 63   Temp 97.5  F (36.4  C) (Oral)   Resp 18  SpO2 97% room air.

## 2025-07-12 NOTE — PROGRESS NOTES
[facility] [facility] DISCHARGE NOTE    Patient discharged to home at 2:30 PM via ambulation. Accompanied by spouse and staff. Discharge instructions reviewed with patient and spouse, opportunity offered to ask questions. Prescriptions sent to patients preferred pharmacy. All belongings sent with patient.    Hortencia Matson RN

## 2025-07-12 NOTE — H&P
[facility]    History and Physical - Hospitalist Service       Date of Admission:  7/11/2025    Assessment & Plan      Prabhakar Randall is a 74 year old male admitted on 7/11/2025. Presented with fever and chills, generalized weakness    Acute febrile illness  Suspect viral etiology  Generalized weakness  - Influenza/COVID negative  Temperature 100.4  - CRP 28.08, Pro-Placido 1.16, WBC 5.5, UA with few bacteria and mucus squamous epi present  - check tick borne illness set  - CT chest abdomen pelvis with persistent splenomegaly, no lymphadenopathy, no acute process  -Status post Rocephin 2 g IV x 1 in emergency department  - No clear SIRS or sepsis, no meningeal signs thus LP not indicated, will hold off on further antibiotics given no clear bacterial source  -Unclear etiology at this point  - CRP, CK pending further work up for muscle etiology if appropriate   -Has already received antibiotics, if further concern for infectious etiology obtain blood cultures  - Cannot rule out Keppra as the etiology, discussed with neurology in a.m.    Acute kidney injury  - Creatinine 1.43 from baseline 0.98  - Status post 1 L fluids in emergency department, continue IV fluids and serial renal function monitoring    Elevated bilirubin  - Bilirubin 3.3 from previous 1.6  - Benign abdominal exam, serial monitoring and if evidence of abdominal discomfort or escalating bilirubin despite fluid resuscitation consider MRCP    Elevated aminotransferases  -     Microscopic hematuria  - Follow-up outpatient for chronicity      Chronic medical conditions  Partial symptomatic epilepsy with complex partial seizures, not intractable, without status epilepticus recently started on Keppra 250 mg nightly dosed by neurology hold and reassess dose in a.m. given acute kidney injury    HTN on atenolol 50 mg, aspirin 81 mg, atorvastatin 40 mg will hold following aminotransferase, ezetimibe 10 g hold as recently initiated,  "hold losartan given renal function  HLD  Ascending thoracic aorta dilated at 4.5 cm and stable on CT  LETA on CPAP  Nephrolithiasis, nonobstructing on CT  Renal lesion stable since 2022 on CT  BPH         Instructions for use   aspirin 81 MG EC tablet Take 81 mg by mouth daily   atenolol (TENORMIN) 50 MG tablet Take 1 tablet (50 mg) by mouth daily   atorvastatin (LIPITOR) 40 MG tablet Take 1 tablet (40 mg) by mouth daily   ezetimibe (ZETIA) 10 MG tablet Take 10 mg by mouth daily.   levETIRAcetam (KEPPRA) 250 MG tablet Take 1 tablet (250 mg) by mouth at bedtime.   losartan (COZAAR) 50 MG tablet Take 1 tablet (50 mg) by mouth daily   Multiple Vitamin (MULTIVITAMIN ADULT PO)         Diet: Combination Diet Regular Diet Adult    DVT Prophylaxis: Heparin SQ  Frost Catheter: Not present  Lines: None     Code Status: Full Code      Clinically Significant Risk Factors Present on Admission                 # Drug Induced Platelet Defect: home medication list includes an antiplatelet medication   # Hypertension: Noted on problem list           # Obesity: Estimated body mass index is 31.45 kg/m  as calculated from the following:    Height as of 7/1/25: 1.753 m (5' 9\").    Weight as of 7/1/25: 96.6 kg (213 lb).              Disposition Plan      Expected Discharge Date: 07/13/2025                The patient's care was discussed with the Attending Physician, Dr. Stacy by hospitalits team.        Maribell Joseph MD  [facility]  Securely message with the Vocera Web Console (learn more here)  Text page via [employer] Paging/Directory      Visit/Communication Style   Virtual (Video) communication was used to evaluate Prabhakar.  Prabhakar consented to the use of video communication: yes  Video START time: 11:39 PM, 7/11/2025  Video STOP time: 11:48 PM, 7/11/2025   Patient's location: [facility]   Provider's location during the visit: [facility] Tele-medicine site  " "      ______________________________________________________________________    Chief Complaint   Fever and chills, generalized weakness    History is obtained from the patient and medical record     History of Present Illness   Prabhakar Randall is a 74 year old male who presented with fever and chills, generalized weakness    States that he started From 7/3 and started to not feel well around 7/6.  Generalized weakness, decreased energy.  For the last 2 nights he has had fevers and chills to the point that he needed to bundled up in blankets at night and still did not feel warm.  Denies sick contacts, viral prodrome type symptomatology, GI or  symptomatology.  He was hoping that he would feel better however on 2 separate occasions his friends told him that he did not look well.  Denies orthostatic symptomatology, chest pain, shortness of breath, palpitations.  Denies any known tick exposure, rash, recent viral symptomatology.    Per ED provider:   \"referred from urgent care for further comprehensive evaluation and care due to concern for intermittent hypotension, concern about acute febrile illness with temperature of 100.4 at home and concern about possible medication effect after recently starting Keppra for seizure history.\"    Review of Systems    Complete ROS was obtained, negative unless otherwise stated above.     Past Medical History    I have reviewed this patient's medical history and updated it with pertinent information if needed.   No past medical history on file.    Past Surgical History   I have reviewed this patient's surgical history and updated it with pertinent information if needed.  No past surgical history on file.    Social History   I have reviewed this patient's social history and updated it with pertinent information if needed.  Social History     Tobacco Use    Smoking status: Never     Passive exposure: Never    Smokeless tobacco: Never   Vaping Use    Vaping status: Never Used "   Substance Use Topics    Alcohol use: Yes     Comment: once a month       Family History   Family history not relevant due to age and clinical condition    Prior to Admission Medications   Prior to Admission Medications   Prescriptions Last Dose Informant Patient Reported? Taking?   Multiple Vitamin (MULTIVITAMIN ADULT PO)   Yes No   aspirin 81 MG EC tablet   Yes No   Sig: Take 81 mg by mouth daily   atenolol (TENORMIN) 50 MG tablet   No No   Sig: Take 1 tablet (50 mg) by mouth daily   atorvastatin (LIPITOR) 40 MG tablet   No No   Sig: Take 1 tablet (40 mg) by mouth daily   ezetimibe (ZETIA) 10 MG tablet   Yes No   Sig: Take 10 mg by mouth daily.   levETIRAcetam (KEPPRA) 250 MG tablet   No No   Sig: Take 1 tablet (250 mg) by mouth at bedtime.   losartan (COZAAR) 50 MG tablet   No No   Sig: Take 1 tablet (50 mg) by mouth daily      Facility-Administered Medications: None     Allergies   Allergies   Allergen Reactions    Oxycodone Other (See Comments)     Other reaction(s): hypotension       Physical Exam   Vital Signs: Temp: 100.7  F (38.2  C) Temp src: Oral BP: 131/72 Pulse: 69   Resp: 18 SpO2: 94 %      Weight: 0 lbs 0 oz    Gen:  Well-developed, well-nourished, in no acute distress, lying semi-supine in hospital stretcher  HEENT:  Anicteric sclera, PER, hearing intact to voice  Resp:  No accessory muscle use, breath sounds clear; no wheezes no rales no rhonchi  Card:  No murmur, normal S1, S2   Abd:  Soft per RN exam, no TTP, non-distended, normoactive bowel sounds are present  Musc:  Normal strength and movement of the major muscle groups without obvious deformity  Ext: no LE edema   Psych:  Good insight, oriented to person, place and time, not anxious, not agitated  Skin: no rash  All aspects of video and audio used for evaluation with nursing assistance.     Data     Recent Labs   Lab 07/11/25  1448   WBC 5.5   HGB 16.3   MCV 86         POTASSIUM 4.3   CHLORIDE 99   CO2 23   BUN 27.5*   CR 1.43*    ANIONGAP 13   TYREL 9.2   *   ALBUMIN 4.2   PROTTOTAL 7.2   BILITOTAL 3.3*   ALKPHOS 123   ALT 53   *         Recent Results (from the past 24 hours)   CT Chest/Abdomen/Pelvis w Contrast    Narrative    EXAM: CT CHEST/ABDOMEN/PELVIS W CONTRAST  LOCATION: [facility]  DATE: 7/11/2025    INDICATION: Labile blood pressures with prearrival hypotension.Fever, Advanced age. Hx of aortic dilatation and elevated PSA.  Evaluate for acute infectious etiology versus other potential process to explain fever and hypotension  COMPARISON: CT abdomen/pelvis 12/16/2024, CT angiogram chest 7/9/2024  TECHNIQUE: CT scan of the chest, abdomen, and pelvis was performed following injection of IV contrast. Multiplanar reformats were obtained. Dose reduction techniques were used.   CONTRAST: 104mL isovue 370    FINDINGS:   LUNGS AND PLEURA: No focal airspace consolidation, pleural effusion or pneumothorax. Small calcified granulomas in both lungs.    MEDIASTINUM/AXILLAE: No suspicious lymphadenopathy. Mild aneurysmal dilation of the ascending thoracic aorta, measuring up to 4.5 cm in diameter, not significantly changed from prior exam. No pericardial effusion.    CORONARY ARTERY CALCIFICATION: None.    HEPATOBILIARY: Multiple hepatic cysts, no specific follow-up recommended. No evidence of cholecystitis or biliary obstruction.    PANCREAS: Normal.    SPLEEN: Persistent splenomegaly, measuring up to 18 cm in AP dimension, stable to slightly increased in comparison to prior exam. No focal splenic lesions are visualized.    ADRENAL GLANDS: Normal.    KIDNEYS/BLADDER: Small nonobstructing bilateral renal calculi without ureterolithiasis or hydronephrosis. Urinary bladder is partially decompressed without diffuse wall thickening or surrounding inflammation. Significant median lobe hypertrophy of   prostate with compression of the bladder base, not significantly changed over multiple prior exams.  Indeterminate, mildly heterogeneous left upper pole renal lesion measuring up to 0.9 cm, stable in size since at least 2022 (series 3 image 179).    BOWEL: No obstruction or inflammatory change. Normal appendix.    LYMPH NODES: No suspicious lymphadenopathy. No abdominopelvic free fluid.    VASCULATURE: Mild calcified and noncalcified atherosclerosis. No abdominal aortic aneurysm.    PELVIC ORGANS: Prostatomegaly with significant median lobe hypertrophy and compression of the bladder base, not significantly changed from prior exam.    MUSCULOSKELETAL: No acute bony abnormality or suspicious osseous lesions.      Impression    IMPRESSION:  1.  No definite infectious source in the chest, abdomen or pelvis.  2.  Nonobstructing bilateral renal calculi without ureterolithiasis or hydronephrosis.  3.  Persistent prostatomegaly with compression of the bladder base, not significantly changed over multiple prior exams.  4.  Persistent splenomegaly.  5.  Indeterminate, slightly heterogeneous subcentimeter lesion in the upper pole the left kidney, favor benign/indolent etiology given stability since at least 2022, could consider follow-up contrast-enhanced MRI in one year if clinically indicated.  6.  Stable aneurysmal dilation of the ascending thoracic aorta, measuring up to 4.5 cm in diameter.       This patient has been and evaluated using all aspects of audio and visual telemedicine with patient's consent and nursing assistance  Provider Location: [facility]  Patient Location: [place]

## 2025-07-14 ENCOUNTER — PATIENT OUTREACH (OUTPATIENT)
Dept: FAMILY MEDICINE | Facility: CLINIC | Age: 75
End: 2025-07-14
Payer: COMMERCIAL

## 2025-07-14 LAB — B BURGDOR IGG+IGM SER QL: 0.11

## 2025-07-14 NOTE — TELEPHONE ENCOUNTER
[facility] Call Center    Phone Message    May a detailed message be left on voicemail: yes     Reason for Call: Patients wife Sarika (ctc on file) is returning a call that they missed from the clinic. Please reach back out to Sarika at [phone].    Action Taken: Message routed to:  Other: [facility]

## 2025-07-14 NOTE — TELEPHONE ENCOUNTER
Attempted to reach patient and his wife to discuss recent ED visit. LMTRC.     Verónica JONES RN, BSN  [facility]

## 2025-07-14 NOTE — TELEPHONE ENCOUNTER
Returned call to Sarika to discuss Prabhakar' recent ED visit. Sarika reports that patient is doing better, no more fevers and BP is no longer so low as they are monitoring it at home. ED workup determined diagnosis of Lyme's disease due to elevated labs and intermittent fevers. Received antibiotics and fluids in hospital and antibiotics to take at home. Sarika is going to make appt with cardiology is discuss BP med. No questions or concerns at this time.    Routing as GRABIEL.    Maribell MAST RN  [facility]

## 2025-07-14 NOTE — TELEPHONE ENCOUNTER
Transitions of Care Outreach  Chief Complaint   Patient presents with    Hospital F/U       Most Recent Admission Date: 7/11/2025   Most Recent Admission Diagnosis: Abnormal CT scan - R93.89  PERLA (acute kidney injury) - N17.9  Acute febrile illness - R50.9     Most Recent Discharge Date: 7/12/2025   Most Recent Discharge Diagnosis: PERLA (acute kidney injury) - N17.9  Acute febrile illness - R50.9  Abnormal CT scan - R93.89     Transitions of Care Assessment    Discharge Assessment  How are you doing now that you are home?: he is doing well, hold placed on losartan  How are your symptoms? (Red Flag symptoms escalate to triage hotline per guidelines): Improved  Do you know how to contact your clinic care team if you have future questions or changes to your health status? : Yes  Does the patient have their discharge instructions? : Yes  Does the patient have questions regarding their discharge instructions? : No  Were you started on any new medications or were there changes to any of your previous medications? : No  Does the patient have all of their medications?: Yes  Do you have questions regarding any of your medications? : Yes (see comment)  Do you have all of your needed medical supplies or equipment (DME)?  (i.e. oxygen tank, CPAP, cane, etc.): Yes    Follow up Plan     Discharge Follow-Up  Discharge follow up appointment scheduled in alignment with recommended follow up timeframe or Transitions of Risk Category? (Low = within 30 days; Moderate= within 14 days; High= within 7 days): No  Patient's follow up appointment not scheduled: Patient accepted scheduling support. Appt scheduled/requested per protocol.    Future Appointments   Date Time Provider Department Center   7/15/2025  3:30 PM Kiara Posada MD [facility]   4/7/2026 10:00 AM Harrison Jimenez MD [facility]       Outpatient Plan as outlined on AVS reviewed with patient.    For any urgent concerns, please contact our 24 hour nurse triage  line: [phone] ([phone])       Rhea Lerner RN

## 2025-07-15 ENCOUNTER — OFFICE VISIT (OUTPATIENT)
Dept: FAMILY MEDICINE | Facility: CLINIC | Age: 75
End: 2025-07-15
Payer: COMMERCIAL

## 2025-07-15 VITALS
SYSTOLIC BLOOD PRESSURE: 136 MMHG | HEIGHT: 69 IN | HEART RATE: 55 BPM | BODY MASS INDEX: 31.28 KG/M2 | TEMPERATURE: 97.1 F | WEIGHT: 211.2 LBS | DIASTOLIC BLOOD PRESSURE: 82 MMHG | OXYGEN SATURATION: 93 %

## 2025-07-15 DIAGNOSIS — R74.01 TRANSAMINITIS: ICD-10-CM

## 2025-07-15 DIAGNOSIS — Z09 HOSPITAL DISCHARGE FOLLOW-UP: Primary | ICD-10-CM

## 2025-07-15 DIAGNOSIS — N17.9 AKI (ACUTE KIDNEY INJURY): ICD-10-CM

## 2025-07-15 LAB
ALBUMIN SERPL BCG-MCNC: 4 G/DL (ref 3.5–5.2)
ALP SERPL-CCNC: 178 U/L (ref 40–150)
ALT SERPL W P-5'-P-CCNC: 127 U/L (ref 0–70)
ANION GAP SERPL CALCULATED.3IONS-SCNC: 12 MMOL/L (ref 7–15)
AST SERPL W P-5'-P-CCNC: 230 U/L (ref 0–45)
BILIRUB SERPL-MCNC: 1.4 MG/DL
BUN SERPL-MCNC: 18.6 MG/DL (ref 8–23)
CALCIUM SERPL-MCNC: 9.4 MG/DL (ref 8.8–10.4)
CHLORIDE SERPL-SCNC: 106 MMOL/L (ref 98–107)
CREAT SERPL-MCNC: 0.93 MG/DL (ref 0.67–1.17)
EGFRCR SERPLBLD CKD-EPI 2021: 85 ML/MIN/1.73M2
GLUCOSE SERPL-MCNC: 119 MG/DL (ref 70–99)
HCO3 SERPL-SCNC: 23 MMOL/L (ref 22–29)
POTASSIUM SERPL-SCNC: 4.4 MMOL/L (ref 3.4–5.3)
PROT SERPL-MCNC: 6.8 G/DL (ref 6.4–8.3)
SODIUM SERPL-SCNC: 141 MMOL/L (ref 135–145)

## 2025-07-15 PROCEDURE — 99213 OFFICE O/P EST LOW 20 MIN: CPT | Performed by: STUDENT IN AN ORGANIZED HEALTH CARE EDUCATION/TRAINING PROGRAM

## 2025-07-15 PROCEDURE — 3079F DIAST BP 80-89 MM HG: CPT | Performed by: STUDENT IN AN ORGANIZED HEALTH CARE EDUCATION/TRAINING PROGRAM

## 2025-07-15 PROCEDURE — 86803 HEPATITIS C AB TEST: CPT | Performed by: STUDENT IN AN ORGANIZED HEALTH CARE EDUCATION/TRAINING PROGRAM

## 2025-07-15 PROCEDURE — 3075F SYST BP GE 130 - 139MM HG: CPT | Performed by: STUDENT IN AN ORGANIZED HEALTH CARE EDUCATION/TRAINING PROGRAM

## 2025-07-15 PROCEDURE — 1126F AMNT PAIN NOTED NONE PRSNT: CPT | Performed by: STUDENT IN AN ORGANIZED HEALTH CARE EDUCATION/TRAINING PROGRAM

## 2025-07-15 PROCEDURE — 80053 COMPREHEN METABOLIC PANEL: CPT | Performed by: STUDENT IN AN ORGANIZED HEALTH CARE EDUCATION/TRAINING PROGRAM

## 2025-07-15 PROCEDURE — 36415 COLL VENOUS BLD VENIPUNCTURE: CPT | Performed by: STUDENT IN AN ORGANIZED HEALTH CARE EDUCATION/TRAINING PROGRAM

## 2025-07-15 ASSESSMENT — PAIN SCALES - GENERAL: PAINLEVEL_OUTOF10: NO PAIN (0)

## 2025-07-15 NOTE — PROGRESS NOTES
Assessment & Plan     Hospital discharge follow-up  PERLA (acute kidney injury)  > overall improved, patient still has 7 days of doxycyline left for suspected Lyme's disease   - Comprehensive metabolic panel (BMP + Alb, Alk Phos, ALT, AST, Total. Bili, TP)  - Patient aware that he can restart his losartan if his kidney function has stabilized, and if his blood pressure starting to creep more than 130 systolic or more than 190 diastolic, if his blood pressure continues to remain less than 130/90 as he reports it has been, then he can continue to remain off of the losartan for now    The longitudinal plan of care for the diagnosis(es)/condition(s) as documented were addressed during this visit. Due to the added complexity in care, I will continue to support Prabhakar in the subsequent management and with ongoing continuity of care.      Subjective   Prabhakar is a 74 year old, presenting for the following health issues:  Hospital F/U and Health Maintenance (Declines vaccinations today)        7/15/2025     3:14 PM   Additional Questions   Roomed by Meagan SUÁREZ LPN   Accompanied by wife - stuart         7/15/2025     3:14 PM   Patient Reported Additional Medications   Patient reports taking the following new medications no new meds     HPI          7/14/2025   Post Discharge Outreach   How are you doing now that you are home? he is doing well, hold placed on losartan   How are your symptoms? (Red Flag symptoms escalate to triage hotline per guidelines) Improved   Does the patient have their discharge instructions?  Yes   Does the patient have questions regarding their discharge instructions?  No   Were you started on any new medications or were there changes to any of your previous medications?  No   Does the patient have all of their medications? Yes   Do you have questions regarding any of your medications?  Yes (see comment)   Do you have all of your needed medical supplies or equipment (DME)?  (i.e. oxygen tank, CPAP, cane,  "etc.) Yes       Hospital Follow-up Visit:    Hospital/Nursing Home/IP Rehab Facility: [facility]  Most Recent Admission Date: 7/11/2025   Most Recent Admission Diagnosis: Abnormal CT scan - R93.89  PERLA (acute kidney injury) - N17.9  Acute febrile illness - R50.9     Most Recent Discharge Date: 7/12/2025   Most Recent Discharge Diagnosis: PERLA (acute kidney injury) - N17.9  Acute febrile illness - R50.9  Abnormal CT scan - R93.89   Do you have any other stressors you would like to discuss with your provider? No  Problems taking medications regularly:  None  Medication changes since discharge: Start : Doxycycline Hyclate 100 mg BID  Problems adhering to non-medication therapy:  None    Summary of hospitalization:  Discharge summary unavailable  Diagnostic Tests/Treatments reviewed.  Follow up needed: patient was discharged on Doxycycline has about 1 week remaining   Other Healthcare Providers Involved in Patient s Care:         cardiology at [facility]   Update since discharge: improved.         Plan of care communicated with patient and family           Review of Systems   Constitutional:  Negative for chills and fever.   HENT:  Negative for ear pain.    Eyes:  Negative for pain.   Respiratory:  Negative for cough.    Cardiovascular:  Negative for chest pain.   Gastrointestinal:  Negative for abdominal pain.   Genitourinary:  Negative for dysuria.   Musculoskeletal:  Negative for neck pain.   Skin:  Negative for rash.   Neurological:  Negative for headaches.         Objective    /82 (BP Location: Right arm, Patient Position: Sitting, Cuff Size: Adult Regular)   Pulse 55   Temp 97.1  F (36.2  C) (Tympanic)   Ht 1.741 m (5' 8.54\")   Wt 95.8 kg (211 lb 3.2 oz)   SpO2 93%   BMI 31.61 kg/m    Body mass index is 31.61 kg/m .  Physical Exam  Constitutional:       General: He is not in acute distress.  HENT:      Head: Normocephalic and atraumatic.      Right Ear: External " ear normal.      Left Ear: External ear normal.      Mouth/Throat:      Mouth: Mucous membranes are moist.      Pharynx: Oropharynx is clear. No oropharyngeal exudate or posterior oropharyngeal erythema.   Eyes:      Extraocular Movements: Extraocular movements intact.   Cardiovascular:      Rate and Rhythm: Normal rate and regular rhythm.      Heart sounds: Normal heart sounds.   Pulmonary:      Effort: Pulmonary effort is normal. No respiratory distress.      Breath sounds: Normal breath sounds. No wheezing or rhonchi.   Abdominal:      Palpations: Abdomen is soft. There is no mass.      Tenderness: There is no abdominal tenderness.   Musculoskeletal:         General: No deformity. Normal range of motion.      Cervical back: Normal range of motion and neck supple.   Skin:     General: Skin is warm.      Findings: No rash.   Neurological:      General: No focal deficit present.      Mental Status: He is alert and oriented to person, place, and time.   Psychiatric:         Mood and Affect: Mood normal.             Signed Electronically by: TOPHER BRYANT MD

## 2025-07-15 NOTE — PATIENT INSTRUCTIONS
Hello! It was a pleasure seeing you today. Just some things we discussed at today's visit:     Blood pressure    Let's aim to keep your blood pressure less than 130 on the top number and less than 90 on the bottom number   If your blood pressure is creeping up above 130/90 please resume your losartan provided your kidney functions have stabilized       Sincerely,     Kiara Posada MD

## 2025-07-16 ENCOUNTER — RESULTS FOLLOW-UP (OUTPATIENT)
Dept: FAMILY MEDICINE | Facility: CLINIC | Age: 75
End: 2025-07-16
Payer: COMMERCIAL

## 2025-07-16 DIAGNOSIS — R74.01 TRANSAMINITIS: Primary | ICD-10-CM

## 2025-07-16 LAB
BACTERIA SPEC CULT: NO GROWTH
BACTERIA SPEC CULT: NO GROWTH
HCV AB SERPL QL IA: NONREACTIVE

## 2025-07-26 DIAGNOSIS — I10 ESSENTIAL HYPERTENSION: ICD-10-CM

## 2025-07-28 NOTE — TELEPHONE ENCOUNTER
"Pending Prescriptions:                       Disp   Refills    losartan (COZAAR) 50 MG tablet [Pharmacy M*90 tab*3        Sig: TAKE 1 TABLET(50 MG) BY MOUTH DAILY    Routing refill request to provider for review/approval because:  See below from recent hospital follow up visit on 7/15/25; med is currently \"paused\", please advise.    Hospital discharge follow-up  PERLA (acute kidney injury)  > overall improved, patient still has 7 days of doxycyline left for suspected Lyme's disease   - Comprehensive metabolic panel (BMP + Alb, Alk Phos, ALT, AST, Total. Bili, TP)  - Patient aware that he can restart his losartan if his kidney function has stabilized, and if his blood pressure starting to creep more than 130 systolic or more than 190 diastolic, if his blood pressure continues to remain less than 130/90 as he reports it has been, then he can continue to remain off of the losartan for now      "

## 2025-07-29 ENCOUNTER — TELEPHONE (OUTPATIENT)
Dept: FAMILY MEDICINE | Facility: CLINIC | Age: 75
End: 2025-07-29
Payer: COMMERCIAL

## 2025-07-29 RX ORDER — LOSARTAN POTASSIUM 50 MG/1
50 TABLET ORAL DAILY
Qty: 90 TABLET | Refills: 3 | Status: SHIPPED | OUTPATIENT
Start: 2025-07-29

## 2025-08-11 ENCOUNTER — TELEPHONE (OUTPATIENT)
Dept: FAMILY MEDICINE | Facility: CLINIC | Age: 75
End: 2025-08-11

## 2025-08-11 ENCOUNTER — ALLIED HEALTH/NURSE VISIT (OUTPATIENT)
Dept: FAMILY MEDICINE | Facility: CLINIC | Age: 75
End: 2025-08-11
Payer: COMMERCIAL

## 2025-08-11 DIAGNOSIS — Z09 HOSPITAL DISCHARGE FOLLOW-UP: Primary | ICD-10-CM

## 2025-08-11 PROCEDURE — 99207 PR NO CHARGE NURSE ONLY: CPT
